# Patient Record
Sex: FEMALE | Race: WHITE | NOT HISPANIC OR LATINO | Employment: FULL TIME | URBAN - METROPOLITAN AREA
[De-identification: names, ages, dates, MRNs, and addresses within clinical notes are randomized per-mention and may not be internally consistent; named-entity substitution may affect disease eponyms.]

---

## 2017-01-31 ENCOUNTER — OFFICE VISIT (OUTPATIENT)
Dept: GASTROENTEROLOGY | Facility: CLINIC | Age: 47
End: 2017-01-31
Payer: COMMERCIAL

## 2017-01-31 ENCOUNTER — HOSPITAL ENCOUNTER (EMERGENCY)
Facility: HOSPITAL | Age: 47
Discharge: HOME | End: 2017-01-31
Attending: EMERGENCY MEDICINE
Payer: COMMERCIAL

## 2017-01-31 VITALS
BODY MASS INDEX: 38.22 KG/M2 | HEART RATE: 68 BPM | DIASTOLIC BLOOD PRESSURE: 78 MMHG | HEIGHT: 62 IN | WEIGHT: 207.69 LBS | SYSTOLIC BLOOD PRESSURE: 130 MMHG

## 2017-01-31 VITALS
BODY MASS INDEX: 28.71 KG/M2 | DIASTOLIC BLOOD PRESSURE: 71 MMHG | OXYGEN SATURATION: 97 % | RESPIRATION RATE: 17 BRPM | TEMPERATURE: 98 F | WEIGHT: 156 LBS | HEART RATE: 61 BPM | HEIGHT: 62 IN | SYSTOLIC BLOOD PRESSURE: 129 MMHG

## 2017-01-31 DIAGNOSIS — K76.89 NODULE ON LIVER: ICD-10-CM

## 2017-01-31 DIAGNOSIS — R10.13 EPIGASTRIC ABDOMINAL PAIN: Primary | ICD-10-CM

## 2017-01-31 DIAGNOSIS — K80.20 GALLSTONES: ICD-10-CM

## 2017-01-31 DIAGNOSIS — K80.50 BILIARY COLIC: Primary | ICD-10-CM

## 2017-01-31 DIAGNOSIS — R11.14 BILIOUS VOMITING WITH NAUSEA: ICD-10-CM

## 2017-01-31 DIAGNOSIS — K80.20 CALCULUS OF GALLBLADDER WITHOUT CHOLECYSTITIS WITHOUT OBSTRUCTION: ICD-10-CM

## 2017-01-31 LAB
ALBUMIN SERPL BCP-MCNC: 3.6 G/DL
ALP SERPL-CCNC: 55 U/L
ALT SERPL W/O P-5'-P-CCNC: 20 U/L
ANION GAP SERPL CALC-SCNC: 12 MMOL/L
AST SERPL-CCNC: 19 U/L
B-HCG UR QL: NEGATIVE
BACTERIA #/AREA URNS HPF: NORMAL /HPF
BASOPHILS # BLD AUTO: 0.07 K/UL
BASOPHILS NFR BLD: 0.8 %
BILIRUB SERPL-MCNC: 0.3 MG/DL
BILIRUB UR QL STRIP: NEGATIVE
BUN SERPL-MCNC: 12 MG/DL
CALCIUM SERPL-MCNC: 9.4 MG/DL
CHLORIDE SERPL-SCNC: 106 MMOL/L
CLARITY UR: CLEAR
CO2 SERPL-SCNC: 21 MMOL/L
COLOR UR: YELLOW
CREAT SERPL-MCNC: 1 MG/DL
DIFFERENTIAL METHOD: NORMAL
EOSINOPHIL # BLD AUTO: 0.4 K/UL
EOSINOPHIL NFR BLD: 4.3 %
ERYTHROCYTE [DISTWIDTH] IN BLOOD BY AUTOMATED COUNT: 12.5 %
EST. GFR  (AFRICAN AMERICAN): >60 ML/MIN/1.73 M^2
EST. GFR  (NON AFRICAN AMERICAN): >60 ML/MIN/1.73 M^2
GLUCOSE SERPL-MCNC: 88 MG/DL
GLUCOSE UR QL STRIP: NEGATIVE
HCT VFR BLD AUTO: 40.8 %
HGB BLD-MCNC: 14.2 G/DL
HGB UR QL STRIP: NEGATIVE
KETONES UR QL STRIP: NEGATIVE
LEUKOCYTE ESTERASE UR QL STRIP: ABNORMAL
LIPASE SERPL-CCNC: 27 U/L
LYMPHOCYTES # BLD AUTO: 3.3 K/UL
LYMPHOCYTES NFR BLD: 38.8 %
MCH RBC QN AUTO: 28.9 PG
MCHC RBC AUTO-ENTMCNC: 34.8 %
MCV RBC AUTO: 83 FL
MICROSCOPIC COMMENT: NORMAL
MONOCYTES # BLD AUTO: 0.6 K/UL
MONOCYTES NFR BLD: 6.6 %
NEUTROPHILS # BLD AUTO: 4.2 K/UL
NEUTROPHILS NFR BLD: 49.5 %
NITRITE UR QL STRIP: NEGATIVE
PH UR STRIP: 8 [PH] (ref 5–8)
PLATELET # BLD AUTO: 269 K/UL
PMV BLD AUTO: 9.8 FL
POTASSIUM SERPL-SCNC: 3.6 MMOL/L
PROT SERPL-MCNC: 6.9 G/DL
PROT UR QL STRIP: NEGATIVE
RBC # BLD AUTO: 4.91 M/UL
RBC #/AREA URNS HPF: 2 /HPF (ref 0–4)
SODIUM SERPL-SCNC: 139 MMOL/L
SP GR UR STRIP: 1.01 (ref 1–1.03)
SQUAMOUS #/AREA URNS HPF: 5 /HPF
URN SPEC COLLECT METH UR: ABNORMAL
UROBILINOGEN UR STRIP-ACNC: NEGATIVE EU/DL
WBC # BLD AUTO: 8.54 K/UL
WBC #/AREA URNS HPF: 1 /HPF (ref 0–5)

## 2017-01-31 PROCEDURE — 63600175 PHARM REV CODE 636 W HCPCS: Performed by: EMERGENCY MEDICINE

## 2017-01-31 PROCEDURE — 83690 ASSAY OF LIPASE: CPT

## 2017-01-31 PROCEDURE — 80053 COMPREHEN METABOLIC PANEL: CPT

## 2017-01-31 PROCEDURE — 85025 COMPLETE CBC W/AUTO DIFF WBC: CPT

## 2017-01-31 PROCEDURE — 25000003 PHARM REV CODE 250: Performed by: EMERGENCY MEDICINE

## 2017-01-31 PROCEDURE — 81025 URINE PREGNANCY TEST: CPT

## 2017-01-31 PROCEDURE — 99284 EMERGENCY DEPT VISIT MOD MDM: CPT | Mod: 25

## 2017-01-31 PROCEDURE — 96376 TX/PRO/DX INJ SAME DRUG ADON: CPT

## 2017-01-31 PROCEDURE — 99204 OFFICE O/P NEW MOD 45 MIN: CPT | Mod: S$GLB,,, | Performed by: INTERNAL MEDICINE

## 2017-01-31 PROCEDURE — 96361 HYDRATE IV INFUSION ADD-ON: CPT

## 2017-01-31 PROCEDURE — 99999 PR PBB SHADOW E&M-EST. PATIENT-LVL III: CPT | Mod: PBBFAC,,, | Performed by: INTERNAL MEDICINE

## 2017-01-31 PROCEDURE — 96375 TX/PRO/DX INJ NEW DRUG ADDON: CPT

## 2017-01-31 PROCEDURE — 81000 URINALYSIS NONAUTO W/SCOPE: CPT

## 2017-01-31 PROCEDURE — 96374 THER/PROPH/DIAG INJ IV PUSH: CPT

## 2017-01-31 RX ORDER — HYDROMORPHONE HYDROCHLORIDE 2 MG/ML
1 INJECTION, SOLUTION INTRAMUSCULAR; INTRAVENOUS; SUBCUTANEOUS
Status: COMPLETED | OUTPATIENT
Start: 2017-01-31 | End: 2017-01-31

## 2017-01-31 RX ORDER — SODIUM CHLORIDE 9 MG/ML
1000 INJECTION, SOLUTION INTRAVENOUS
Status: COMPLETED | OUTPATIENT
Start: 2017-01-31 | End: 2017-01-31

## 2017-01-31 RX ORDER — MEPERIDINE HYDROCHLORIDE 50 MG/ML
25 INJECTION INTRAMUSCULAR; INTRAVENOUS; SUBCUTANEOUS
Status: COMPLETED | OUTPATIENT
Start: 2017-01-31 | End: 2017-01-31

## 2017-01-31 RX ORDER — ONDANSETRON 2 MG/ML
4 INJECTION INTRAMUSCULAR; INTRAVENOUS
Status: COMPLETED | OUTPATIENT
Start: 2017-01-31 | End: 2017-01-31

## 2017-01-31 RX ORDER — OXYCODONE AND ACETAMINOPHEN 10; 325 MG/1; MG/1
1 TABLET ORAL
Status: COMPLETED | OUTPATIENT
Start: 2017-01-31 | End: 2017-01-31

## 2017-01-31 RX ORDER — LEVOTHYROXINE SODIUM 88 UG/1
125 TABLET ORAL DAILY
Status: ON HOLD | COMMUNITY
End: 2017-02-13 | Stop reason: CLARIF

## 2017-01-31 RX ORDER — OXYCODONE AND ACETAMINOPHEN 10; 325 MG/1; MG/1
1 TABLET ORAL EVERY 4 HOURS PRN
Qty: 18 TABLET | Refills: 0 | Status: ON HOLD | OUTPATIENT
Start: 2017-01-31 | End: 2017-02-13 | Stop reason: HOSPADM

## 2017-01-31 RX ORDER — ONDANSETRON 4 MG/1
4 TABLET, FILM COATED ORAL EVERY 8 HOURS PRN
Qty: 12 TABLET | Refills: 0 | Status: SHIPPED | OUTPATIENT
Start: 2017-01-31 | End: 2017-02-10

## 2017-01-31 RX ORDER — HYOSCYAMINE SULFATE 0.5 MG/ML
0.5 INJECTION, SOLUTION SUBCUTANEOUS
Status: COMPLETED | OUTPATIENT
Start: 2017-01-31 | End: 2017-01-31

## 2017-01-31 RX ORDER — PANTOPRAZOLE SODIUM 20 MG/1
20 TABLET, DELAYED RELEASE ORAL DAILY
Qty: 30 TABLET | Refills: 11 | Status: SHIPPED | OUTPATIENT
Start: 2017-01-31 | End: 2017-03-10

## 2017-01-31 RX ORDER — PROPRANOLOL HYDROCHLORIDE 20 MG/1
60 TABLET ORAL DAILY
Status: ON HOLD | COMMUNITY
End: 2017-02-13 | Stop reason: CLARIF

## 2017-01-31 RX ADMIN — OXYCODONE HYDROCHLORIDE AND ACETAMINOPHEN 1 TABLET: 10; 325 TABLET ORAL at 07:01

## 2017-01-31 RX ADMIN — SODIUM CHLORIDE 1000 ML: 0.9 INJECTION, SOLUTION INTRAVENOUS at 05:01

## 2017-01-31 RX ADMIN — HYDROMORPHONE HYDROCHLORIDE 1 MG: 2 INJECTION, SOLUTION INTRAMUSCULAR; INTRAVENOUS; SUBCUTANEOUS at 08:01

## 2017-01-31 RX ADMIN — HYOSCYAMINE SULFATE 0.5 MG: 0.5 INJECTION, SOLUTION SUBCUTANEOUS at 05:01

## 2017-01-31 RX ADMIN — ONDANSETRON 4 MG: 2 INJECTION INTRAMUSCULAR; INTRAVENOUS at 08:01

## 2017-01-31 RX ADMIN — ONDANSETRON 4 MG: 2 INJECTION INTRAMUSCULAR; INTRAVENOUS at 06:01

## 2017-01-31 RX ADMIN — MEPERIDINE HYDROCHLORIDE 25 MG: 50 INJECTION INTRAMUSCULAR; INTRAVENOUS; SUBCUTANEOUS at 06:01

## 2017-01-31 NOTE — PROGRESS NOTES
Subjective:       Patient ID: Luana Lou is a 47 y.o. female.    Chief Complaint: Cholelithiasis    HPI Comments: The patient has been having epigastric abdominal pain on and off over the past week. It is described as being pressure like sensation in the epigastrium and a sharp component. It spread over the entire abdomen from there. The 1st 2 episodes lasted ~ 3-4 hours. There was no relationship to meals in fact they eat at ~5 o'clock. The episodes occur at 3 or 4 in the morning. Pain is worst when lying back; seems better with leaning forward. Nausea with the last episode last night, and bilious vomiting which lead to temporary relief of pain. No Coffee ground or BRB emesis. No relief from Tums, Gas X or pepto-bismol.     Review of Systems   Constitutional: Negative for activity change, appetite change, chills, diaphoresis, fatigue, fever and unexpected weight change.   HENT: Negative for congestion, ear discharge, ear pain, hearing loss, nosebleeds, postnasal drip and tinnitus.    Eyes: Negative for photophobia and visual disturbance.   Respiratory: Negative for apnea, cough, choking, chest tightness, shortness of breath and wheezing.    Cardiovascular: Negative for chest pain, palpitations and leg swelling.   Gastrointestinal: Positive for abdominal pain, nausea and vomiting. Negative for abdominal distention, anal bleeding, blood in stool, constipation, diarrhea and rectal pain.   Genitourinary: Negative for difficulty urinating, dyspareunia, dysuria, flank pain, frequency, hematuria, menstrual problem, pelvic pain, urgency, vaginal bleeding and vaginal discharge.   Musculoskeletal: Negative for arthralgias, back pain, gait problem, joint swelling, myalgias and neck stiffness.   Skin: Negative for pallor and rash.   Neurological: Negative for dizziness, tremors, seizures, syncope, speech difficulty, weakness, numbness and headaches.   Hematological: Negative for adenopathy.    Psychiatric/Behavioral: Negative for agitation, confusion, hallucinations, sleep disturbance and suicidal ideas.       Objective:      Physical Exam   Constitutional: She is oriented to person, place, and time. She appears well-developed and well-nourished.   HENT:   Head: Normocephalic and atraumatic.   Bilateral turbinate congestion   Eyes: Conjunctivae and EOM are normal. Pupils are equal, round, and reactive to light. Right eye exhibits no discharge. Left eye exhibits no discharge. No scleral icterus.   Neck: Normal range of motion. Neck supple. No JVD present. No thyromegaly present.   Cardiovascular: Normal rate, regular rhythm, normal heart sounds and intact distal pulses.  Exam reveals no gallop and no friction rub.    No murmur heard.  Pulmonary/Chest: Effort normal and breath sounds normal. No respiratory distress. She has no wheezes. She has no rales. She exhibits no tenderness.   Abdominal: Soft. Bowel sounds are normal. She exhibits no distension and no mass. There is no tenderness. There is no rebound and no guarding.   Musculoskeletal: Normal range of motion. She exhibits no edema.   Lymphadenopathy:     She has no cervical adenopathy.   Neurological: She is alert and oriented to person, place, and time. She has normal reflexes. She exhibits normal muscle tone. Coordination normal.   Skin: Skin is warm and dry. No rash noted. No erythema. No pallor.   Psychiatric: She has a normal mood and affect. Her behavior is normal. Judgment and thought content normal.   Vitals reviewed.      Assessment:     Epigastric Abdominal Pain    Nausea and vomiting    Cholelithiasis    Solitary Liver Nodule  No diagnosis found.    Plan:       MRI.  Referred to general surgery; discussed in detail the nature of a solitary liver nodule workup and the implications and possible complications that are related.

## 2017-01-31 NOTE — ED AVS SNAPSHOT
[facility] -   [address] [address] [address]               Luana Lou   2017  5:19 AM   ED    Description:  Female : 1970   Department:  [facility]           Your Care was Coordinated By:     Provider Role From To    Tomeka Hoffman MD Attending Provider 17 0527 17 [phone]    Corinna Santana MD Attending Provider 17 0604 --      Reason for Visit     Abdominal Pain           Diagnoses this Visit        Comments    Biliary colic    -  Primary     Gallstones           ED Disposition     None           To Do List           Follow-up Information     Follow up with [facility] GENER[facility]ERY. Schedule an appointment as soon as possible for a visit in 1 day.    Specialty:  General Surgery    Why:  Return to the Emergency Room, If symptoms worsen    Contact information:    [address] [place] [id number]  [phone]       These Medications        Disp Refills Start End    oxycodone-acetaminophen (PERCOCET)  mg per tablet 18 tablet 0 2017     Take 1 tablet by mouth every 4 (four) hours as needed for Pain. - Oral    ondansetron (ZOFRAN) 4 MG tablet 12 tablet 0 2017     Take 1 tablet (4 mg total) by mouth every 8 (eight) hours as needed. - Oral      [facility]     [facility] -  Assistance  Registered nurses in the [facility] provide clinical advisement, health education, appointment booking, and other advisory services.  Call for this free service at [phone].             Medications           Message regarding Medications     Verify the changes and/or additions to your medication regime listed below are the same as discussed with your clinician today.  If any of these changes or additions are incorrect, please notify your healthcare provider.        START taking these NEW medications        Refills     oxycodone-acetaminophen (PERCOCET)  mg per tablet 0    Sig: Take 1 tablet by mouth every 4 (four) hours as needed for Pain.    Class: Print    Route: Oral    ondansetron (ZOFRAN) 4 MG tablet 0    Sig: Take 1 tablet (4 mg total) by mouth every 8 (eight) hours as needed.    Class: Print    Route: Oral      These medications were administered today        Dose Freq    hyoscyamine injection 0.5 mg 0.5 mg ED 1 Time    Sig: Inject 1 mL (0.5 mg total) into the vein ED 1 Time.    Class: Normal    Route: Intravenous    0.9%  NaCl infusion 1,000 mL ED 1 Time    Sig: Inject 1,000 mLs into the vein ED 1 Time.    Class: Normal    Route: Intravenous    meperidine 50 mg/mL injection 25 mg 25 mg ED 1 Time    Sig: Inject 0.5 mLs (25 mg total) into the vein ED 1 Time.    Class: Normal    Route: Intravenous    ondansetron injection 4 mg 4 mg ED 1 Time    Sig: Inject 4 mg into the vein ED 1 Time.    Class: Normal    Route: Intravenous    oxycodone-acetaminophen  mg per tablet 1 tablet 1 tablet ED 1 Time    Sig: Take 1 tablet by mouth ED 1 Time.    Class: Normal    Route: Oral    hydromorphone (PF) injection 1 mg 1 mg ED 1 Time    Sig: Inject 0.5 mLs (1 mg total) into the vein ED 1 Time.    Class: Normal    Route: Intravenous    ondansetron injection 4 mg 4 mg ED 1 Time    Sig: Inject 4 mg into the vein ED 1 Time.    Class: Normal    Route: Intravenous           Verify that the below list of medications is an accurate representation of the medications you are currently taking.  If none reported, the list may be blank. If incorrect, please contact your healthcare provider. Carry this list with you in case of emergency.           Current Medications     levothyroxine (SYNTHROID) 88 MCG tablet Take 88 mcg by mouth once daily.    propranolol (INDERAL) 20 MG tablet Take 20 mg by mouth once daily.    ondansetron (ZOFRAN) 4 MG tablet Take 1 tablet (4 mg total) by mouth every 8 (eight) hours as needed.    oxycodone-acetaminophen  "(PERCOCET)  mg per tablet Take 1 tablet by mouth every 4 (four) hours as needed for Pain.           Clinical Reference Information           Your Vitals Were     BP Pulse Temp Resp Height Weight    153/82 68 98.2 °F (36.8 °C) (Oral) 17 5' 2" (1.575 m) 70.8 kg (156 lb)    Last Period SpO2 BMI          01/05/2017 (Approximate) 99% 28.53 kg/m2        Allergies as of 1/31/2017        Reactions    Compazine [Prochlorperazine]       Immunizations Administered on Date of Encounter - 1/31/2017     None      ED Micro, Lab, POCT     Start Ordered       Status Ordering Provider    01/31/17 0607 01/31/17 0606  Pregnancy, urine rapid (UPT)  Once      Final result     01/31/17 0540 01/31/17 0540  CBC W/ AUTO DIFFERENTIAL  STAT      Final result     01/31/17 0540 01/31/17 0540  Comp. Metabolic Panel  STAT      Final result     01/31/17 0540 01/31/17 0540  Lipase  Once      Final result     01/31/17 0540 01/31/17 0540  Urinalysis - Clean Catch  STAT      Final result     01/31/17 0540 01/31/17 0540  Urinalysis Microscopic  Once      Final result       ED Imaging Orders     Start Ordered       Status Ordering Provider    01/31/17 0540 01/31/17 0540  US Abdomen Limited  1 time imaging      In process       Discharge References/Attachments     GALLSTONES WITH BILIARY COLIC (CONFIRMED) (ENGLISH)    GALLSTONES, DISCHARGE INSTRUCTIONS (ENGLISH)      [facility] Sign-Up     Activating your [facility] account is as easy as 1-2-3!     1) Visit [email or web address], select Sign Up Now, enter this activation code and your date of birth, then select Next.  [email or web address]  Expires: 3/17/2017  8:41 AM      2) Create a username and password to use when you visit [facility] in the future and select a security question in case you lose your password and select Next.    3) Enter your e-mail address and click Sign Up!    Additional Information  If you have questions, please e-mail [email or web address] or call [phone] to talk to our [facility] " staff. Remember, [facility] is NOT to be used for urgent needs. For medical emergencies, dial 911.          [facility] complies with applicable Federal civil rights laws and does not discriminate on the basis of race, color, national origin, age, disability, or sex.        Language Assistance Services     ATTENTION: Language assistance services are available, free of charge. Please call [phone].      ATENCIÓN: Si habla español, tiene a lebron disposición servicios gratuitos de asistencia lingüística. Llame al [phone].     CHÚ Ý: N?u b?n nói Ti?ng Vi?t, có các d?ch v? h? tr? ngôn ng? mi?n phí dành cho b?n. G?i s? [phone].

## 2017-01-31 NOTE — ED PROVIDER NOTES
SCRIBE #1 NOTE: I, am scribing for, and in the presence of. Other sections scribed: HPI, ROS, & PEx.   SCRIBE #2 NOTE: I, Saulo Linda, am scribing for, and in the presence of,  Corinna Santana MD. I have scribed the remaining portions of the note not scribed by Scribe #1.     History      Chief Complaint   Patient presents with    Abdominal Pain     generalized upper quadrant abd pain 8/10 that began 0230 this morning; continuous nausea and dry heaving       Review of patient's allergies indicates:   Allergen Reactions    Compazine [prochlorperazine]         HPI   HPI    1/31/2017, 5:34 AM   History obtained from the patient      History of Present Illness: Luana Lou is a 47 y.o. female patient who presents to the Emergency Department for epigastric abdominal pain which onset gradually since 3 hours PTA. Symptoms are constant and moderate in severity. Pt reports she  had similar episodes of the same pain. No mitigating or exacerbating factors reported. Associated sxs include nausea. Patient denies any fever, emesis, diarrhea, chills, constipation, hematochezia, dysuria, hematuria, urinary frequency, and all other sxs at this time. Prior Tx includes Pepto Bismol and Gas X. No further complaints or concerns at this time.     Arrival mode: Personal vehicle    PCP: Nallely Mejia MD       Past Medical History:  Past Medical History   Diagnosis Date    Hypothyroid     Migraines        Past Surgical History:  History reviewed. No pertinent past surgical history.      Family History:  History reviewed. No pertinent family history.    Social History:  Social History     Social History Main Topics    Smoking status: Never Smoker    Smokeless tobacco: Not on file    Alcohol use No    Drug use: No    Sexual activity: Unknown       ROS   Review of Systems   Constitutional: Negative for chills and fever.   HENT: Negative for sore throat.    Respiratory: Negative for shortness of breath.     Cardiovascular: Negative for chest pain.   Gastrointestinal: Positive for abdominal pain (upper epigastic region) and nausea. Negative for blood in stool, constipation, diarrhea and vomiting.   Genitourinary: Negative for dysuria, frequency and hematuria.   Musculoskeletal: Negative for back pain.   Skin: Negative for rash.   Neurological: Negative for weakness.   Hematological: Does not bruise/bleed easily.       Physical Exam    Initial Vitals   BP Pulse Resp Temp SpO2   01/31/17 0516 01/31/17 0516 01/31/17 0516 01/31/17 0516 01/31/17 0516   127/79 65 20 98.2 °F (36.8 °C) 100 %      Physical Exam  Nursing Notes and Vital Signs Reviewed.  Constitutional: Patient is in no acute distress. Awake and alert. Well-developed and well-nourished. obese  Head: Atraumatic. Normocephalic.  Eyes: PERRL. EOM intact. Conjunctivae are not pale. No scleral icterus.  ENT: Mucous membranes are moist. Oropharynx is clear and symmetric.    Neck: Supple. Full ROM. No lymphadenopathy.  Cardiovascular: Regular rate. Regular rhythm. No murmurs, rubs, or gallops. Distal pulses are 2+ and symmetric.  Pulmonary/Chest: No respiratory distress. Clear to auscultation bilaterally. No wheezing, rales, or rhonchi.  Abdominal: Soft and non-distended.  Right upper quadrant tenderness. No rebound, guarding, or rigidity.  Good bowel sounds.    Genitourinary: No CVA tenderness  Musculoskeletal: Moves all extremities. No obvious deformities. No edema. No calf tenderness.  Skin: Warm and dry.  Neurological:  Alert, awake, and appropriate.  Normal speech.  No acute focal neurological deficits are appreciated.  Psychiatric: Normal affect. Good eye contact. Appropriate in content.    ED Course    Procedures  ED Vital Signs:  Vitals:    01/31/17 0516 01/31/17 0532 01/31/17 0715 01/31/17 0800   BP: 127/79 (!) 140/80 (!) 159/90 (!) 153/82   Pulse: 65 90 74 68   Resp: 20 19 13 17   Temp: 98.2 °F (36.8 °C)      TempSrc: Oral      SpO2: 100% 100% 98% 99%  "  Weight: 70.8 kg (156 lb)      Height: 5' 2" (1.575 m)          Abnormal Lab Results:  Labs Reviewed   COMPREHENSIVE METABOLIC PANEL - Abnormal; Notable for the following:        Result Value    CO2 21 (*)     All other components within normal limits   URINALYSIS - Abnormal; Notable for the following:     Leukocytes, UA Trace (*)     All other components within normal limits   CBC W/ AUTO DIFFERENTIAL   LIPASE   PREGNANCY TEST, URINE RAPID   URINALYSIS MICROSCOPIC        All Lab Results:  Results for orders placed or performed during the hospital encounter of 01/31/17   CBC W/ AUTO DIFFERENTIAL   Result Value Ref Range    WBC 8.54 3.90 - 12.70 K/uL    RBC 4.91 4.00 - 5.40 M/uL    Hemoglobin 14.2 12.0 - 16.0 g/dL    Hematocrit 40.8 37.0 - 48.5 %    MCV 83 82 - 98 fL    MCH 28.9 27.0 - 31.0 pg    MCHC 34.8 32.0 - 36.0 %    RDW 12.5 11.5 - 14.5 %    Platelets 269 150 - 350 K/uL    MPV 9.8 9.2 - 12.9 fL    Gran # 4.2 1.8 - 7.7 K/uL    Lymph # 3.3 1.0 - 4.8 K/uL    Mono # 0.6 0.3 - 1.0 K/uL    Eos # 0.4 0.0 - 0.5 K/uL    Baso # 0.07 0.00 - 0.20 K/uL    Gran% 49.5 38.0 - 73.0 %    Lymph% 38.8 18.0 - 48.0 %    Mono% 6.6 4.0 - 15.0 %    Eosinophil% 4.3 0.0 - 8.0 %    Basophil% 0.8 0.0 - 1.9 %    Differential Method Automated    Comp. Metabolic Panel   Result Value Ref Range    Sodium 139 136 - 145 mmol/L    Potassium 3.6 3.5 - 5.1 mmol/L    Chloride 106 95 - 110 mmol/L    CO2 21 (L) 23 - 29 mmol/L    Glucose 88 70 - 110 mg/dL    BUN, Bld 12 6 - 20 mg/dL    Creatinine 1.0 0.5 - 1.4 mg/dL    Calcium 9.4 8.7 - 10.5 mg/dL    Total Protein 6.9 6.0 - 8.4 g/dL    Albumin 3.6 3.5 - 5.2 g/dL    Total Bilirubin 0.3 0.1 - 1.0 mg/dL    Alkaline Phosphatase 55 55 - 135 U/L    AST 19 10 - 40 U/L    ALT 20 10 - 44 U/L    Anion Gap 12 8 - 16 mmol/L    eGFR if African American >60 >60 mL/min/1.73 m^2    eGFR if non African American >60 >60 mL/min/1.73 m^2   Lipase   Result Value Ref Range    Lipase 27 4 - 60 U/L   Urinalysis - Clean Catch "   Result Value Ref Range    Specimen UA Urine, Clean Catch     Color, UA Yellow Yellow, Straw, Jennifer    Appearance, UA Clear Clear    pH, UA 8.0 5.0 - 8.0    Specific Gravity, UA 1.015 1.005 - 1.030    Protein, UA Negative Negative    Glucose, UA Negative Negative    Ketones, UA Negative Negative    Bilirubin (UA) Negative Negative    Occult Blood UA Negative Negative    Nitrite, UA Negative Negative    Urobilinogen, UA Negative <2.0 EU/dL    Leukocytes, UA Trace (A) Negative   Pregnancy, urine rapid (UPT)   Result Value Ref Range    Preg Test, Ur Negative    Urinalysis Microscopic   Result Value Ref Range    RBC, UA 2 0 - 4 /hpf    WBC, UA 1 0 - 5 /hpf    Bacteria, UA Rare None-Occ /hpf    Squam Epithel, UA 5 /hpf    Microscopic Comment SEE COMMENT          Imaging Results:  Imaging Results         US Abdomen Limited (Final result) Result time:  01/31/17 08:44:18    Final result by Eliza Samuels MD (01/31/17 08:44:18)    Impression:           Cholelithiasis.  No sonographic evidence of acute cholecystitis or biliary duct dilatation.    Indeterminate 2.5 cm hypoechoic mass in the left hepatic lobe.  Recommend nonurgent dedicated MRI of the liver with and without intravenous contrast for further evaluation.      Electronically signed by: ELIZA SAMUELS MD  Date:     01/31/17  Time:    08:44     Narrative:    Exam: US ABDOMEN LIMITED    Clinical History: Mid epigastric pain.  Initial encounter.      Findings:     The liver size and echotexture is normal. Subtle 2.5 cm solid well-defined hypoechoic, subcapsular mass in the left hepatic lobe. No gallbladder wall thickening or pericholecystic fluid.  There are 2 echogenic shadowing small gallstones present. The common duct measures 5 mm. The right kidney measures 10.5cm in long axis and has a normal sonographic appearance. Visualized pancreas and inferior vena cava appear normal. No free fluid in the upper abdomen. Hepatopedal flow noted in the portal vein.                       The Emergency Provider reviewed the vital signs and test results, which are outlined above.    ED Discussion     6:00 AM: Dr. Hoffman transfers care of pt to Dr. Corinna Santana, pending lab and radiology results.    8:40 AM: Reassessed pt at this time.  No distress noted, mild RUQ tenderness, negative Carter's sign. Pt states her condition has improved at this time and she is feeling better, pain well controlled no vomiting, labs unremarkable. Discussed with pt all pertinent ED information and results. Discussed pt dx and plan of tx. Gave pt all f/u outpatient with general surgery and return to the ED instructions intractable pain, vomiting, fever or any new or concerning symptoms. All questions and concerns were addressed at this time. Pt expresses understanding of information and instructions, and is comfortable with plan to discharge. Pt is stable for discharge.    I discussed with patient and/or family/caretaker that evaluation in the ED does not suggest any emergent or life threatening medical conditions requiring immediate intervention beyond what was provided in the ED, and I believe patient is safe for discharge.  Regardless, an unremarkable evaluation in the ED does not preclude the development or presence of a serious of life threatening condition. As such, patient was instructed to return immediately for any worsening or change in current symptoms.      ED Medication(s):  Medications   hyoscyamine injection 0.5 mg (0.5 mg Intravenous Given 1/31/17 0546)   0.9%  NaCl infusion (0 mLs Intravenous Stopped 1/31/17 0630)   meperidine 50 mg/mL injection 25 mg (25 mg Intravenous Given 1/31/17 0614)   ondansetron injection 4 mg (4 mg Intravenous Given 1/31/17 0611)   oxycodone-acetaminophen  mg per tablet 1 tablet (1 tablet Oral Given 1/31/17 0745)   hydromorphone (PF) injection 1 mg (1 mg Intravenous Given 1/31/17 0806)   ondansetron injection 4 mg (4 mg Intravenous Given 1/31/17 0805)       New  Prescriptions    ONDANSETRON (ZOFRAN) 4 MG TABLET    Take 1 tablet (4 mg total) by mouth every 8 (eight) hours as needed.    OXYCODONE-ACETAMINOPHEN (PERCOCET)  MG PER TABLET    Take 1 tablet by mouth every 4 (four) hours as needed for Pain.       Follow-up Information     Follow up with [facility]. Schedule an appointment as soon as possible for a visit in 1 day.    Specialty:  General Surgery    Why:  Return to the Emergency Room, If symptoms worsen    Contact information:    [address] [facility] [address] [phone]            Medical Decision Making    Medical Decision Making:   Clinical Tests:   Lab Tests: Ordered and Reviewed  Radiological Study: Ordered and Reviewed           Scribe Attestation:   Scribe #1: I performed the above scribed service and the documentation accurately describes the services I performed. I attest to the accuracy of the note.    Attending:   Physician Attestation Statement for Scribe #1: I, Tomeka Hoffman MD, personally performed the services described in this documentation, as scribed by Kris Khanna, in my presence, and it is both accurate and complete.       Scribe Attestation:   Scribe #2: I performed the above scribed service and the documentation accurately describes the services I performed. I attest to the accuracy of the note.    Attending Attestation:           Physician Attestation for Scribe:    Physician Attestation Statement for Scribe #2: I, Corinna Santana MD, reviewed documentation, as scribed by Saulo Linda in my presence, and it is both accurate and complete. I also acknowledge and confirm the content of the note done by Scribe #1.          Clinical Impression       ICD-10-CM ICD-9-CM   1. Biliary colic K80.50 574.20   2. Gallstones K80.20 574.20       Disposition:   Disposition: Discharged  Condition: Stable         Corinna Santana MD  01/31/17 0854

## 2017-02-01 ENCOUNTER — TELEPHONE (OUTPATIENT)
Dept: GASTROENTEROLOGY | Facility: CLINIC | Age: 47
End: 2017-02-01

## 2017-02-01 NOTE — TELEPHONE ENCOUNTER
----- Message from Elizabeth Holm sent at 2/1/2017  9:48 AM CST -----  Would like to speak to nurse. Please call back at [phone]. Thanks//cdb

## 2017-02-01 NOTE — TELEPHONE ENCOUNTER
Called patient and patient did not answer, Called patient to inform patient that MRI is scheduled for Tuesday.

## 2017-02-06 ENCOUNTER — TELEPHONE (OUTPATIENT)
Dept: GASTROENTEROLOGY | Facility: CLINIC | Age: 47
End: 2017-02-06

## 2017-02-06 NOTE — TELEPHONE ENCOUNTER
Called and did a Peer to Peer with patients insurance. Patient peer to peer was approved and authorization number that was given was [id number]. Number was given to pre services.

## 2017-02-06 NOTE — TELEPHONE ENCOUNTER
----- Message from Anil Carmen sent at 2/6/2017  9:07 AM CST -----  Contact: Pt  Pt request call from nurse regarding her MRI schedule for tomorrow, pt state her insurance needs more information before they will pay, please contact pt at [phone]

## 2017-02-07 ENCOUNTER — HOSPITAL ENCOUNTER (OUTPATIENT)
Dept: RADIOLOGY | Facility: HOSPITAL | Age: 47
Discharge: HOME | End: 2017-02-07
Attending: INTERNAL MEDICINE
Payer: COMMERCIAL

## 2017-02-07 DIAGNOSIS — R10.13 EPIGASTRIC ABDOMINAL PAIN: ICD-10-CM

## 2017-02-07 DIAGNOSIS — K80.20 CALCULUS OF GALLBLADDER WITHOUT CHOLECYSTITIS WITHOUT OBSTRUCTION: ICD-10-CM

## 2017-02-07 DIAGNOSIS — K76.89 NODULE ON LIVER: ICD-10-CM

## 2017-02-07 DIAGNOSIS — R11.14 BILIOUS VOMITING WITH NAUSEA: ICD-10-CM

## 2017-02-07 PROCEDURE — 25500020 PHARM REV CODE 255: Performed by: INTERNAL MEDICINE

## 2017-02-07 PROCEDURE — A9585 GADOBUTROL INJECTION: HCPCS | Performed by: INTERNAL MEDICINE

## 2017-02-07 PROCEDURE — 74183 MRI ABD W/O CNTR FLWD CNTR: CPT | Mod: TC

## 2017-02-07 RX ORDER — GADOBUTROL 604.72 MG/ML
9 INJECTION INTRAVENOUS
Status: COMPLETED | OUTPATIENT
Start: 2017-02-07 | End: 2017-02-07

## 2017-02-07 RX ADMIN — GADOBUTROL 9 ML: 604.72 INJECTION INTRAVENOUS at 08:02

## 2017-02-09 ENCOUNTER — LAB VISIT (OUTPATIENT)
Dept: LAB | Facility: HOSPITAL | Age: 47
End: 2017-02-09
Attending: INTERNAL MEDICINE
Payer: COMMERCIAL

## 2017-02-09 ENCOUNTER — OFFICE VISIT (OUTPATIENT)
Dept: GASTROENTEROLOGY | Facility: CLINIC | Age: 47
End: 2017-02-09
Payer: COMMERCIAL

## 2017-02-09 VITALS
HEIGHT: 62 IN | WEIGHT: 198.44 LBS | BODY MASS INDEX: 36.52 KG/M2 | SYSTOLIC BLOOD PRESSURE: 128 MMHG | HEART RATE: 74 BPM | DIASTOLIC BLOOD PRESSURE: 78 MMHG

## 2017-02-09 DIAGNOSIS — K76.89 LIVER NODULE: ICD-10-CM

## 2017-02-09 DIAGNOSIS — K80.20 CALCULUS OF GALLBLADDER WITHOUT CHOLECYSTITIS WITHOUT OBSTRUCTION: ICD-10-CM

## 2017-02-09 DIAGNOSIS — K80.51 CALCULUS OF BILE DUCT WITHOUT CHOLANGITIS WITH OBSTRUCTION: Primary | ICD-10-CM

## 2017-02-09 DIAGNOSIS — K80.51 CALCULUS OF BILE DUCT WITHOUT CHOLANGITIS WITH OBSTRUCTION: ICD-10-CM

## 2017-02-09 LAB
ALBUMIN SERPL BCP-MCNC: 3.8 G/DL
ALP SERPL-CCNC: 63 U/L
ALT SERPL W/O P-5'-P-CCNC: 38 U/L
AST SERPL-CCNC: 31 U/L
BILIRUB DIRECT SERPL-MCNC: 0.3 MG/DL
BILIRUB SERPL-MCNC: 0.8 MG/DL
LIPASE SERPL-CCNC: 18 U/L
PROT SERPL-MCNC: 7.1 G/DL

## 2017-02-09 PROCEDURE — 99999 PR PBB SHADOW E&M-EST. PATIENT-LVL III: CPT | Mod: PBBFAC,,, | Performed by: INTERNAL MEDICINE

## 2017-02-09 PROCEDURE — 99213 OFFICE O/P EST LOW 20 MIN: CPT | Mod: S$GLB,,, | Performed by: INTERNAL MEDICINE

## 2017-02-09 PROCEDURE — 80076 HEPATIC FUNCTION PANEL: CPT

## 2017-02-09 PROCEDURE — 36415 COLL VENOUS BLD VENIPUNCTURE: CPT

## 2017-02-09 PROCEDURE — 83690 ASSAY OF LIPASE: CPT

## 2017-02-09 NOTE — MR AVS SNAPSHOT
[facility]  [address]  [place] [address]  Phone: [phone]  Fax: [phone]                  Luana Lou   2017 9:00 AM   Office Visit    Description:  Female : 1970   Provider:  Jamie Perez III, MD   [facility]           Reason for Visit     Follow-up           Diagnoses this Visit        Comments    Calculus of bile duct without cholangitis with obstruction    -  Primary            To Do List           Future Appointments        Provider Department Dept Phone    2017 1:30 PM LAB, SAME DAY [facility] [phone]    2/10/2017 9:45 AM Christian Light MD [facility] General Surgery [phone]      Goals (5 Years of Data)     None      [facility]     [facility] Nurse Care Line -  Assistance  Registered nurses in the [facility] Center provide clinical advisement, health education, appointment booking, and other advisory services.  Call for this free service at [phone].             Medications           Message regarding Medications     Verify the changes and/or additions to your medication regime listed below are the same as discussed with your clinician today.  If any of these changes or additions are incorrect, please notify your healthcare provider.             Verify that the below list of medications is an accurate representation of the medications you are currently taking.  If none reported, the list may be blank. If incorrect, please contact your healthcare provider. Carry this list with you in case of emergency.           Current Medications     levothyroxine (SYNTHROID) 88 MCG tablet Take 88 mcg by mouth once daily.    ondansetron (ZOFRAN) 4 MG tablet Take 1 tablet (4 mg total) by mouth every 8 (eight) hours as needed.    oxycodone-acetaminophen (PERCOCET)  mg per tablet Take 1 tablet by mouth every 4 (four) hours as needed for Pain.     "pantoprazole (PROTONIX) 20 MG tablet Take 1 tablet (20 mg total) by mouth once daily.    propranolol (INDERAL) 20 MG tablet Take 20 mg by mouth once daily.           Clinical Reference Information           Your Vitals Were     BP Pulse Height Weight Last Period BMI    128/78 74 5' 2" (1.575 m) 90 kg (198 lb 6.6 oz) 01/05/2017 (Approximate) 36.29 kg/m2      Blood Pressure          Most Recent Value    BP  128/78      Allergies as of 2/9/2017     Compazine [Prochlorperazine]    Amoxicillin-pot Clavulanate    Doxycycline    Latex, Natural Rubber      Immunizations Administered on Date of Encounter - 2/9/2017     None      Orders Placed During Today's Visit      Normal Orders This Visit    Case request GI: ERCP     Future Labs/Procedures Expected by Expires    Hepatic function panel  2/9/2017 4/10/2018    Lipase  2/9/2017 4/10/2018      Language Assistance Services     ATTENTION: Language assistance services are available, free of charge. Please call [phone].      ATENCIÓN: Si habla español, tiene a lebron disposición servicios gratuitos de asistencia lingüística. Llame al [phone].     CHÚ Ý: N?u b?n nói Ti?ng Vi?t, có các d?ch v? h? tr? ngôn ng? mi?n phí dành cho b?n. G?i s? [phone].         [facility] complies with applicable Federal civil rights laws and does not discriminate on the basis of race, color, national origin, age, disability, or sex.        "

## 2017-02-10 ENCOUNTER — OFFICE VISIT (OUTPATIENT)
Dept: SURGERY | Facility: CLINIC | Age: 47
End: 2017-02-10
Payer: COMMERCIAL

## 2017-02-10 ENCOUNTER — ANESTHESIA EVENT (OUTPATIENT)
Dept: SURGERY | Facility: HOSPITAL | Age: 47
End: 2017-02-10
Payer: COMMERCIAL

## 2017-02-10 VITALS
TEMPERATURE: 99 F | SYSTOLIC BLOOD PRESSURE: 135 MMHG | DIASTOLIC BLOOD PRESSURE: 74 MMHG | WEIGHT: 198.44 LBS | BODY MASS INDEX: 36.29 KG/M2 | HEART RATE: 58 BPM

## 2017-02-10 DIAGNOSIS — K80.20 GALLSTONES: Primary | ICD-10-CM

## 2017-02-10 PROCEDURE — 99203 OFFICE O/P NEW LOW 30 MIN: CPT | Mod: S$GLB,,, | Performed by: SURGERY

## 2017-02-10 PROCEDURE — 99999 PR PBB SHADOW E&M-EST. PATIENT-LVL III: CPT | Mod: PBBFAC,,, | Performed by: SURGERY

## 2017-02-10 RX ORDER — SODIUM CHLORIDE, SODIUM LACTATE, POTASSIUM CHLORIDE, CALCIUM CHLORIDE 600; 310; 30; 20 MG/100ML; MG/100ML; MG/100ML; MG/100ML
INJECTION, SOLUTION INTRAVENOUS CONTINUOUS
Status: CANCELLED | OUTPATIENT
Start: 2017-02-10

## 2017-02-10 RX ORDER — ONDANSETRON 2 MG/ML
4 INJECTION INTRAMUSCULAR; INTRAVENOUS EVERY 12 HOURS PRN
Status: CANCELLED | OUTPATIENT
Start: 2017-02-10

## 2017-02-10 RX ORDER — NORGESTIMATE AND ETHINYL ESTRADIOL 7DAYSX3 28
1 KIT ORAL DAILY
COMMUNITY
End: 2017-10-03 | Stop reason: SDUPTHER

## 2017-02-10 NOTE — PRE ADMISSION SCREENING
Pre op instructions reviewed with patient per phone:    To confirm, Your surgeon has instructed you:  Surgery is scheduled 02/13/17 at 1315.      Please report to [facility] 1st floor main lobby by 1145  Pre admit office to call later only if arrival time changes.      INSTRUCTIONS IMPORTANT!!!  ¨ Do not eat, drink, or smoke after 12 midnight, may have water and apple juice until 3 hours prior to surgery. OK to brush teeth, no gum, candy or mints!    ¨ Take only these medicines with a small swallow of water-morning of surgery.  Synthroid, Propranolol      ____  Do not wear makeup, including mascara.  ____  No powder, lotions or creams to surgical area.  ____  Please remove all jewelry, including piercings and leave at home.  ____  No money or valuables needed. Please leave at home.  ____  Please bring identification and insurance information to hospital.  ____  If going home the same day, arrange for a ride home. You will not be able to   drive if Anesthesia was used.  ____  Children, under 12 years old, must remain in the waiting room with an adult.  They are not allowed in patient areas.  ____  Wear loose fitting clothing. Allow for dressings, bandages.  ____  Stop Aspirin, Ibuprofen, Motrin and Aleve at least 5-7 days before surgery, unless otherwise instructed by your doctor, or the nurse.   You MAY use Tylenol/acetaminophen until day of surgery.  ____  If you take diabetic medication, do not take am of surgery unless instructed by   Doctor.  ____ Stop taking any Fish Oil supplement or any Vitamins that contain Vitamin E at least 5 days prior to surgery.          Bathing Instructions-- The night before surgery and the morning prior to coming to the hospital:   -Do not shave the surgical area.   -Shower and wash your hair and body as usual with your regular soap and shampoo.   -Rinse your hair and body completely.   -Use one packet of hibiclens to wash the surgical site (using your hand)  gently for 5 minutes.  Do not scrub you skin too hard.   -Do not use hibiclens on your head, face, or genitals.   -Do not wash with regular soap after you use the hibiclens.   -Rinse your body thoroughly.   -Dry with clean, soft towel.  Do not use lotion, cream, deodorant, or powders on   the surgical site.    Use antibacterial soap in place of hibiclens if your surgery is on the head, face or genitals.         Surgical Site Infection    Prevention of surgical site infections:     -Keep incisions clean and dry.   -Do not soak/submerge incisions in water until completely healed.   -Do not apply lotions, powders, creams, or deodorants to site.   -Always make sure hands are cleaned with antibacterial soap/ alcohol-based   prior to touching the surgical site.  (This includes doctors, nurses, staff, and yourself.)    Signs and symptoms:   -Redness and pain around the area where you had surgery   -Drainage of cloudy fluid from your surgical wound   -Fever over 100.4  I have read or had read and explained to me, and understand the above information.

## 2017-02-10 NOTE — MR AVS SNAPSHOT
[facility]  [address]  [place] [address]  Phone: [phone]  Fax: [phone]                  Luana Lou   2/10/2017 9:45 AM   Office Visit    Description:  Female : 1970   Provider:  Christian Light MD   Department:  [facility]           Reason for Visit     Consult     Gall Bladder Problem           Diagnoses this Visit        Comments    Gallstones    -  Primary            To Do List           Future Appointments        Provider Department Dept Phone    3/3/2017 9:00 AM Christian Light MD [facility] General Surgery [phone]      Goals (5 Years of Data)     None      [facility]     [facility] Nurse Care Line -  Assistance  Registered nurses in the [facility] Center provide clinical advisement, health education, appointment booking, and other advisory services.  Call for this free service at [phone].             Medications           Message regarding Medications     Verify the changes and/or additions to your medication regime listed below are the same as discussed with your clinician today.  If any of these changes or additions are incorrect, please notify your healthcare provider.        STOP taking these medications     ondansetron (ZOFRAN) 4 MG tablet Take 1 tablet (4 mg total) by mouth every 8 (eight) hours as needed.           Verify that the below list of medications is an accurate representation of the medications you are currently taking.  If none reported, the list may be blank. If incorrect, please contact your healthcare provider. Carry this list with you in case of emergency.           Current Medications     levothyroxine (SYNTHROID) 88 MCG tablet Take 88 mcg by mouth once daily.    oxycodone-acetaminophen (PERCOCET)  mg per tablet Take 1 tablet by mouth every 4 (four) hours as needed for Pain.    pantoprazole (PROTONIX) 20 MG tablet Take 1 tablet (20 mg total) by mouth once  daily.    propranolol (INDERAL) 20 MG tablet Take 20 mg by mouth once daily.           Clinical Reference Information           Your Vitals Were     BP Pulse Temp Weight Last Period BMI    135/74 58 98.6 °F (37 °C) 90 kg (198 lb 6.6 oz) 01/05/2017 (Approximate) 36.29 kg/m2      Blood Pressure          Most Recent Value    BP  135/74      Allergies as of 2/10/2017     Compazine [Prochlorperazine]    Amoxicillin-pot Clavulanate    Doxycycline    Latex, Natural Rubber      Immunizations Administered on Date of Encounter - 2/10/2017     None      Orders Placed During Today's Visit      Normal Orders This Visit    Case Request Operating Room: CHOLECYSTECTOMY-LAPAROSCOPIC and cholangiogram , BIOPSY-LIVER-LAPAROSCOPIC     Medium Risk of VTE       Language Assistance Services     ATTENTION: Language assistance services are available, free of charge. Please call [phone].      ATENCIÓN: Si habla cecille, tiene a lebron disposición servicios gratuitos de asistencia lingüística. Llame al [phone].     CHÚ Ý: N?u b?n nói Ti?ng Vi?t, có các d?ch v? h? tr? ngôn ng? mi?n phí dành cho b?n. G?i s? [phone].         [facility] complies with applicable Federal civil rights laws and does not discriminate on the basis of race, color, national origin, age, disability, or sex.

## 2017-02-10 NOTE — LETTER
February 10, 2017      Jamie Perez III, MD  [address] [address]           [facility]  [address] [place] [address]  Phone: [phone]  Fax: [phone]          Patient: Luana Lou   MR Number: [id number]   YOB: 1970   Date of Visit: 2/10/2017       Dear Dr. Jamie Perez III:    Thank you for referring Luana Lou to me for evaluation. Attached you will find relevant portions of my assessment and plan of care.    If you have questions, please do not hesitate to call me. I look forward to following Luana Lou along with you.    Sincerely,    Christian Light MD    Enclosure  CC:  No Recipients    If you would like to receive this communication electronically, please contact [email or web address] or [phone] to request more information on [employer] access.    For providers and/or their staff who would like to refer a patient to [facility], please contact us through our one-stop-shop provider referral line, [facility] , at [phone].    If you feel you have received this communication in error or would no longer like to receive these types of communications, please e-mail [email or web address]

## 2017-02-10 NOTE — PROGRESS NOTES
CONSULTATION FOR:  Dr. Jamie Perez III.    REASON FOR CONSULTATION:  Gallstones, possible common bile duct stone and a   liver lesion.    HISTORY OF PRESENT ILLNESS:  The patient is a 47-year-old white female who is   actually doing fairly well up until about a week ago.  She had been dieting and   actually lost 6 pounds over a 6-week period, but then about 10 or 12 days ago,   she had an episode of epigastric right upper quadrant pain and severe nausea   that kind went away on its own, and then it occurred again.  The third time it   happened, it has basically stayed.  That has been over the past 11 days now.  It   is sort of a pressure sensation.  It is mainly in the right upper quadrant and   it is always a little bit of discomfort there.  When she eats, it gets worse in   the right upper quadrant and goes in the epigastrium.  It does not radiate to   the back.  Denies any fever, chills, acholic stools, dark urine or jaundice.    Her bowel movements over the last few days have been small, but normal.  Her   urine is normal color.  The last episode, she nausea and a little bit of bilious   violent vomiting.  She cannot really keep solids down well.  She is able to   keep most of her liquids down.  She has been using Zofran for nausea.  She had   originally tried Protonix for this.  It did not help.  The patient had an   ultrasound done, which showed a small shadowing gallstones present.  Common bile   duct and normal size and then a 2.5 cm subcapsular mass in the left lateral   segment of her liver anteriorly.  Because of the liver mass, she actually   underwent an MRI.  The MRI shows the liver mass.  Also on the MRI, the bile   ducts appear to be basically within normal range, but a question of whether it   was a small stone in the distal common bile duct approximately 3 mm.  The   patient actually had further liver functions done yesterday and lipase, which   were normal.    PAST MEDICAL HISTORY:  She has  hypothyroidism and rare migraine headaches.    MEDICATIONS:  Include birth control pills, Synthroid, Zofran p.r.n. for nausea,   propranolol 60 ER for migraines.  ALLERGIES:  Allergic to Compazine with a serious reaction.  She is not allergic   to penicillin and she gets some nausea with the doxycycline.    PAST SURGICAL HISTORY:  She has no real history of surgery.  She has only had a   knife wound to the foot that required irrigation.  The patient is  0.    SOCIAL HISTORY:  Never smoked.  Does not drink alcohol.    FAMILY HISTORY:  Noncontributory.    REVIEW OF SYSTEMS:  CARDIAC:  Denies any cardiac complaints, any chest pains, any palpitations.  RESPIRATORY:  No trouble with breathing.  No shortness of breath or cough.  HEENT:  Denies any visual or auditory complaints.  NEUROLOGIC:  Occasional or rare migraine.  GASTROINTESTINAL:  See present illness.  GENITOURINARY:  Denies any trouble starting or stopping her urine or any kidney   problems.  Denies any neuromuscular complaints.  NEUROLOGIC:  No history of any stroke or any seizures.  HEMATOLOGIC:  Denies any history of any anemia or excess bleeding.    PHYSICAL EXAMINATION:  VITAL SIGNS:  Blood pressure 130/74, pulse is around 60, temperature is normal   at 98.6.  She is 90 kg.  She is alert and oriented x3.  HEENT:  Extraocular movements were intact.  No gross visual or auditory defect.    Her mouth is normal and mucous membranes are well moist.  NECK:  Soft without any adenopathy or thyromegaly.  CHEST:  Clear to auscultation throughout.  HEART:  Reveals normal rate and rhythm.  ABDOMEN:  Mildly obese.  She has got good bowel sounds.  The abdomen essentially   is soft throughout; however, there is some mild tenderness to deep palpation in   the right upper quadrant and epigastrium.  There is no guarding or rebound.    She has good pulses in her groin.  EXTREMITIES:  Reveal adequate strength and range of motion.    I did look at the patient's MRI.  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  Also,   was noted to have gallstones.  Also, this again is not definite evidence of   gallstone in the common bile duct.    IMPRESSION:  1.  Gallstones with the symptoms causing trouble eating regular food.  She is on   a liquid diet at this point to avoid the pain and nausea.  The patient's liver   functions are normal.  Her bile ducts are normal size, so I have low suspicion   for common duct stone.  I think most of her discomfort is due to the gallbladder   symptoms.  2.  A 2.5 cm liver lesion, probably benign.    RECOMMENDATIONS:  I discussed this with Dr. Perez.  Due to the fact that she   has normal liver function tests, her bile ducts are normal size and there is no   definite evidence on the MRI that there is stone in the distal bile duct, I   would proceed with laparoscopic cholecystectomy and serial cholangiogram.  I   explained this to the patient and if the cholangiogram shows stones, then do an   ERCP then.  Also, we will proceed with biopsy of this liver lesion.  If the   liver lesion shows it to be a liver cell adenoma, then we will need to strongly   encourage the patient to stop her birth control pills.  I counseled her as to   the procedure laparoscopic cystectomy, possible need to convert to open   cholecystectomy.  I talked about the cholangiogram and possible need to do an   ERCP afterwards If the cholangiogram is abnormal.  I talked about the liver   biopsy and its risk of bleeding.  I talked about the risk of the gallbladder   surgery including bleeding, infection, injury to the bile duct, bile duct   leakage or injury to the other organs.  The patient is to keep on her Zofran,   try to force plenty of fluids,   avoid NSAIDs and aspirin before the surgery.  She is to be scheduled for the   surgery in a  couple of days.  If she has severe vomiting or cannot keep any   liquids down, she will have to come to the hospital before then.      FAM/JAVIER  dd: 02/10/2017 13:02:32 (CST)  td: 02/10/2017 14:12:00 (CST)  Doc ID   #[id number]  Job ID #[id number]    CC: Jamie Perez M.D.

## 2017-02-13 ENCOUNTER — ANESTHESIA (OUTPATIENT)
Dept: SURGERY | Facility: HOSPITAL | Age: 47
End: 2017-02-13
Payer: COMMERCIAL

## 2017-02-13 ENCOUNTER — HOSPITAL ENCOUNTER (OUTPATIENT)
Facility: HOSPITAL | Age: 47
Discharge: HOME | End: 2017-02-13
Attending: SURGERY | Admitting: SURGERY
Payer: COMMERCIAL

## 2017-02-13 VITALS
TEMPERATURE: 98 F | BODY MASS INDEX: 36.39 KG/M2 | SYSTOLIC BLOOD PRESSURE: 134 MMHG | RESPIRATION RATE: 16 BRPM | WEIGHT: 197.75 LBS | DIASTOLIC BLOOD PRESSURE: 71 MMHG | OXYGEN SATURATION: 95 % | HEART RATE: 69 BPM | HEIGHT: 62 IN

## 2017-02-13 DIAGNOSIS — K80.20 GALLSTONES: ICD-10-CM

## 2017-02-13 LAB
B-HCG UR QL: NEGATIVE
CTP QC/QA: YES

## 2017-02-13 PROCEDURE — 36000709 HC OR TIME LEV III EA ADD 15 MIN: Performed by: SURGERY

## 2017-02-13 PROCEDURE — 37000008 HC ANESTHESIA 1ST 15 MINUTES: Performed by: SURGERY

## 2017-02-13 PROCEDURE — 63600175 PHARM REV CODE 636 W HCPCS: Performed by: SURGERY

## 2017-02-13 PROCEDURE — 99900035 HC TECH TIME PER 15 MIN (STAT)

## 2017-02-13 PROCEDURE — 71000039 HC RECOVERY, EACH ADD'L HOUR: Performed by: SURGERY

## 2017-02-13 PROCEDURE — 88304 TISSUE EXAM BY PATHOLOGIST: CPT | Mod: 26,,, | Performed by: PATHOLOGY

## 2017-02-13 PROCEDURE — 47563 LAPARO CHOLECYSTECTOMY/GRAPH: CPT | Mod: ,,, | Performed by: SURGERY

## 2017-02-13 PROCEDURE — 88313 SPECIAL STAINS GROUP 2: CPT | Mod: 26,,, | Performed by: PATHOLOGY

## 2017-02-13 PROCEDURE — 81025 URINE PREGNANCY TEST: CPT | Performed by: SURGERY

## 2017-02-13 PROCEDURE — 25000003 PHARM REV CODE 250: Performed by: NURSE ANESTHETIST, CERTIFIED REGISTERED

## 2017-02-13 PROCEDURE — 63600175 PHARM REV CODE 636 W HCPCS: Performed by: NURSE ANESTHETIST, CERTIFIED REGISTERED

## 2017-02-13 PROCEDURE — 88342 IMHCHEM/IMCYTCHM 1ST ANTB: CPT | Mod: 26,,, | Performed by: PATHOLOGY

## 2017-02-13 PROCEDURE — 27201423 OPTIME MED/SURG SUP & DEVICES STERILE SUPPLY: Performed by: SURGERY

## 2017-02-13 PROCEDURE — 74300 X-RAY BILE DUCTS/PANCREAS: CPT | Mod: 26,,, | Performed by: SURGERY

## 2017-02-13 PROCEDURE — 25000003 PHARM REV CODE 250: Performed by: ANESTHESIOLOGY

## 2017-02-13 PROCEDURE — 71000033 HC RECOVERY, INTIAL HOUR: Performed by: SURGERY

## 2017-02-13 PROCEDURE — 88342 IMHCHEM/IMCYTCHM 1ST ANTB: CPT | Performed by: PATHOLOGY

## 2017-02-13 PROCEDURE — 25000003 PHARM REV CODE 250: Performed by: SURGERY

## 2017-02-13 PROCEDURE — 37000009 HC ANESTHESIA EA ADD 15 MINS: Performed by: SURGERY

## 2017-02-13 PROCEDURE — 36000708 HC OR TIME LEV III 1ST 15 MIN: Performed by: SURGERY

## 2017-02-13 PROCEDURE — 25500020 PHARM REV CODE 255: Performed by: SURGERY

## 2017-02-13 PROCEDURE — 47379 UNLISTED LAPS PX LIVER: CPT | Mod: ,,, | Performed by: SURGERY

## 2017-02-13 PROCEDURE — 63600175 PHARM REV CODE 636 W HCPCS: Performed by: ANESTHESIOLOGY

## 2017-02-13 RX ORDER — SODIUM CHLORIDE 0.9 % (FLUSH) 0.9 %
3 SYRINGE (ML) INJECTION
Status: DISCONTINUED | OUTPATIENT
Start: 2017-02-13 | End: 2017-02-13 | Stop reason: HOSPADM

## 2017-02-13 RX ORDER — ROCURONIUM BROMIDE 10 MG/ML
INJECTION, SOLUTION INTRAVENOUS
Status: DISCONTINUED | OUTPATIENT
Start: 2017-02-13 | End: 2017-02-13

## 2017-02-13 RX ORDER — SUCCINYLCHOLINE CHLORIDE 20 MG/ML
INJECTION INTRAMUSCULAR; INTRAVENOUS
Status: DISCONTINUED | OUTPATIENT
Start: 2017-02-13 | End: 2017-02-13

## 2017-02-13 RX ORDER — BUPIVACAINE HYDROCHLORIDE AND EPINEPHRINE 2.5; 5 MG/ML; UG/ML
INJECTION, SOLUTION EPIDURAL; INFILTRATION; INTRACAUDAL; PERINEURAL
Status: DISCONTINUED | OUTPATIENT
Start: 2017-02-13 | End: 2017-02-13 | Stop reason: HOSPADM

## 2017-02-13 RX ORDER — ONDANSETRON 8 MG/1
8 TABLET, ORALLY DISINTEGRATING ORAL EVERY 8 HOURS PRN
Status: DISCONTINUED | OUTPATIENT
Start: 2017-02-13 | End: 2017-02-13 | Stop reason: HOSPADM

## 2017-02-13 RX ORDER — SODIUM CHLORIDE, SODIUM LACTATE, POTASSIUM CHLORIDE, CALCIUM CHLORIDE 600; 310; 30; 20 MG/100ML; MG/100ML; MG/100ML; MG/100ML
INJECTION, SOLUTION INTRAVENOUS CONTINUOUS
Status: DISCONTINUED | OUTPATIENT
Start: 2017-02-13 | End: 2017-02-13

## 2017-02-13 RX ORDER — HYDROMORPHONE HYDROCHLORIDE 2 MG/ML
0.2 INJECTION, SOLUTION INTRAMUSCULAR; INTRAVENOUS; SUBCUTANEOUS EVERY 5 MIN PRN
Status: DISCONTINUED | OUTPATIENT
Start: 2017-02-13 | End: 2017-02-13 | Stop reason: HOSPADM

## 2017-02-13 RX ORDER — FENTANYL CITRATE 50 UG/ML
25 INJECTION, SOLUTION INTRAMUSCULAR; INTRAVENOUS EVERY 5 MIN PRN
Status: DISCONTINUED | OUTPATIENT
Start: 2017-02-13 | End: 2017-02-13 | Stop reason: HOSPADM

## 2017-02-13 RX ORDER — SODIUM CHLORIDE, SODIUM LACTATE, POTASSIUM CHLORIDE, CALCIUM CHLORIDE 600; 310; 30; 20 MG/100ML; MG/100ML; MG/100ML; MG/100ML
1000 INJECTION, SOLUTION INTRAVENOUS CONTINUOUS
Status: DISCONTINUED | OUTPATIENT
Start: 2017-02-13 | End: 2017-02-13 | Stop reason: HOSPADM

## 2017-02-13 RX ORDER — LIDOCAINE HCL/PF 100 MG/5ML
SYRINGE (ML) INTRAVENOUS
Status: DISCONTINUED | OUTPATIENT
Start: 2017-02-13 | End: 2017-02-13

## 2017-02-13 RX ORDER — LEVOTHYROXINE SODIUM 125 UG/1
125 TABLET ORAL
Status: DISCONTINUED | OUTPATIENT
Start: 2017-02-14 | End: 2017-02-13 | Stop reason: HOSPADM

## 2017-02-13 RX ORDER — ONDANSETRON 2 MG/ML
INJECTION INTRAMUSCULAR; INTRAVENOUS
Status: DISCONTINUED | OUTPATIENT
Start: 2017-02-13 | End: 2017-02-13

## 2017-02-13 RX ORDER — GLUCAGON 1 MG
KIT INJECTION
Status: DISCONTINUED | OUTPATIENT
Start: 2017-02-13 | End: 2017-02-13

## 2017-02-13 RX ORDER — PROPOFOL 10 MG/ML
VIAL (ML) INTRAVENOUS
Status: DISCONTINUED | OUTPATIENT
Start: 2017-02-13 | End: 2017-02-13

## 2017-02-13 RX ORDER — HYDROMORPHONE HYDROCHLORIDE 1 MG/ML
1 INJECTION, SOLUTION INTRAMUSCULAR; INTRAVENOUS; SUBCUTANEOUS EVERY 4 HOURS PRN
Status: DISCONTINUED | OUTPATIENT
Start: 2017-02-13 | End: 2017-02-13 | Stop reason: HOSPADM

## 2017-02-13 RX ORDER — LEVOTHYROXINE SODIUM 125 UG/1
125 TABLET ORAL DAILY
COMMUNITY
End: 2019-12-02 | Stop reason: SDUPTHER

## 2017-02-13 RX ORDER — OXYCODONE AND ACETAMINOPHEN 10; 325 MG/1; MG/1
1 TABLET ORAL EVERY 4 HOURS PRN
Status: DISCONTINUED | OUTPATIENT
Start: 2017-02-13 | End: 2017-02-13 | Stop reason: HOSPADM

## 2017-02-13 RX ORDER — ONDANSETRON 2 MG/ML
4 INJECTION INTRAMUSCULAR; INTRAVENOUS EVERY 12 HOURS PRN
Status: DISCONTINUED | OUTPATIENT
Start: 2017-02-13 | End: 2017-02-13 | Stop reason: HOSPADM

## 2017-02-13 RX ORDER — KETOROLAC TROMETHAMINE 30 MG/ML
INJECTION, SOLUTION INTRAMUSCULAR; INTRAVENOUS
Status: DISCONTINUED | OUTPATIENT
Start: 2017-02-13 | End: 2017-02-13

## 2017-02-13 RX ORDER — CEFAZOLIN SODIUM 2 G/50ML
2 SOLUTION INTRAVENOUS
Status: COMPLETED | OUTPATIENT
Start: 2017-02-13 | End: 2017-02-13

## 2017-02-13 RX ORDER — MEPERIDINE HYDROCHLORIDE 50 MG/ML
12.5 INJECTION INTRAMUSCULAR; INTRAVENOUS; SUBCUTANEOUS ONCE AS NEEDED
Status: COMPLETED | OUTPATIENT
Start: 2017-02-13 | End: 2017-02-13

## 2017-02-13 RX ORDER — MIDAZOLAM HYDROCHLORIDE 1 MG/ML
INJECTION, SOLUTION INTRAMUSCULAR; INTRAVENOUS
Status: DISCONTINUED | OUTPATIENT
Start: 2017-02-13 | End: 2017-02-13

## 2017-02-13 RX ORDER — NEOSTIGMINE METHYLSULFATE 1 MG/ML
INJECTION, SOLUTION INTRAVENOUS
Status: DISCONTINUED | OUTPATIENT
Start: 2017-02-13 | End: 2017-02-13

## 2017-02-13 RX ORDER — OXYCODONE AND ACETAMINOPHEN 5; 325 MG/1; MG/1
1-2 TABLET ORAL
Qty: 20 TABLET | Refills: 0 | Status: SHIPPED | OUTPATIENT
Start: 2017-02-13 | End: 2017-02-23

## 2017-02-13 RX ORDER — GLYCOPYRROLATE 0.2 MG/ML
INJECTION INTRAMUSCULAR; INTRAVENOUS
Status: DISCONTINUED | OUTPATIENT
Start: 2017-02-13 | End: 2017-02-13

## 2017-02-13 RX ORDER — FENTANYL CITRATE 50 UG/ML
INJECTION, SOLUTION INTRAMUSCULAR; INTRAVENOUS
Status: DISCONTINUED | OUTPATIENT
Start: 2017-02-13 | End: 2017-02-13

## 2017-02-13 RX ORDER — PROPRANOLOL HYDROCHLORIDE 60 MG/1
60 CAPSULE, EXTENDED RELEASE ORAL DAILY
Status: DISCONTINUED | OUTPATIENT
Start: 2017-02-14 | End: 2017-02-13 | Stop reason: HOSPADM

## 2017-02-13 RX ORDER — PROPRANOLOL HYDROCHLORIDE 60 MG/1
60 CAPSULE, EXTENDED RELEASE ORAL DAILY
COMMUNITY
End: 2017-03-10

## 2017-02-13 RX ORDER — NORGESTIMATE AND ETHINYL ESTRADIOL 7DAYSX3 28
1 KIT ORAL DAILY
Status: DISCONTINUED | OUTPATIENT
Start: 2017-02-14 | End: 2017-02-13 | Stop reason: HOSPADM

## 2017-02-13 RX ADMIN — ROCURONIUM BROMIDE 35 MG: 10 INJECTION, SOLUTION INTRAVENOUS at 02:02

## 2017-02-13 RX ADMIN — HYDROMORPHONE HYDROCHLORIDE 0.2 MG: 2 INJECTION, SOLUTION INTRAMUSCULAR; INTRAVENOUS; SUBCUTANEOUS at 06:02

## 2017-02-13 RX ADMIN — CEFAZOLIN SODIUM 2 G: 2 SOLUTION INTRAVENOUS at 02:02

## 2017-02-13 RX ADMIN — PROMETHAZINE HYDROCHLORIDE 6.25 MG: 25 INJECTION INTRAMUSCULAR; INTRAVENOUS at 05:02

## 2017-02-13 RX ADMIN — LIDOCAINE HYDROCHLORIDE 60 MG: 20 INJECTION, SOLUTION INTRAVENOUS at 02:02

## 2017-02-13 RX ADMIN — MEPERIDINE HYDROCHLORIDE 12.5 MG: 50 INJECTION INTRAMUSCULAR; INTRAVENOUS; SUBCUTANEOUS at 04:02

## 2017-02-13 RX ADMIN — GLUCAGON HYDROCHLORIDE 1 MG: KIT at 03:02

## 2017-02-13 RX ADMIN — ROCURONIUM BROMIDE 5 MG: 10 INJECTION, SOLUTION INTRAVENOUS at 02:02

## 2017-02-13 RX ADMIN — ONDANSETRON 4 MG: 2 INJECTION, SOLUTION INTRAMUSCULAR; INTRAVENOUS at 04:02

## 2017-02-13 RX ADMIN — SODIUM CHLORIDE, SODIUM LACTATE, POTASSIUM CHLORIDE, AND CALCIUM CHLORIDE: 600; 310; 30; 20 INJECTION, SOLUTION INTRAVENOUS at 02:02

## 2017-02-13 RX ADMIN — SUCCINYLCHOLINE CHLORIDE 100 MG: 20 INJECTION, SOLUTION INTRAMUSCULAR; INTRAVENOUS at 02:02

## 2017-02-13 RX ADMIN — PROPOFOL 200 MG: 10 INJECTION, EMULSION INTRAVENOUS at 02:02

## 2017-02-13 RX ADMIN — MIDAZOLAM HYDROCHLORIDE 2 MG: 1 INJECTION, SOLUTION INTRAMUSCULAR; INTRAVENOUS at 02:02

## 2017-02-13 RX ADMIN — KETOROLAC TROMETHAMINE 30 MG: 30 INJECTION, SOLUTION INTRAMUSCULAR; INTRAVENOUS at 04:02

## 2017-02-13 RX ADMIN — SODIUM CHLORIDE, SODIUM LACTATE, POTASSIUM CHLORIDE, AND CALCIUM CHLORIDE: 600; 310; 30; 20 INJECTION, SOLUTION INTRAVENOUS at 03:02

## 2017-02-13 RX ADMIN — FENTANYL CITRATE 100 MCG: 50 INJECTION, SOLUTION INTRAMUSCULAR; INTRAVENOUS at 02:02

## 2017-02-13 RX ADMIN — SODIUM CHLORIDE, SODIUM LACTATE, POTASSIUM CHLORIDE, AND CALCIUM CHLORIDE: .6; .31; .03; .02 INJECTION, SOLUTION INTRAVENOUS at 12:02

## 2017-02-13 RX ADMIN — GLYCOPYRROLATE 0.6 MG: 0.2 INJECTION, SOLUTION INTRAMUSCULAR; INTRAVENOUS at 04:02

## 2017-02-13 RX ADMIN — NEOSTIGMINE METHYLSULFATE 4 MG: 1 INJECTION INTRAVENOUS at 04:02

## 2017-02-13 NOTE — BRIEF OP NOTE
[facility]  Brief Operative Note    SUMMARY     Surgery Date: 2/13/2017     Surgeon(s) and Role:     * Christian Light MD - Primary    Assisting Surgeon:DANY de león    Pre-op Diagnosis:  Gallstones [K80.20]2.5cm benign appearing liver mass in left lateral segment    Post-op Diagnosis:  Post-Op Diagnosis Codes:     * Gallstones [K80.20],,2.5cm benign appearing liver mass in left lateral segment of liver    Procedure(s) (LRB):  CHOLECYSTECTOMY-LAPAROSCOPIC WITH CHOLANGIOGRAM  (N/A)  BIOPSY-LIVER-LAPAROSCOPIC (Left)    Anesthesia: General and 20ml of .25% marcaine with epi    Description of Procedure:      Description of the findings of the procedure: normal cholangiogram, gallbladder with small stones, soft liver lesion in left lateral segment    Estimated Blood Loss: 15 mL         Specimens:   Specimen (12h ago through future)    Start     Ordered    02/13/17 1606  Specimen to Pathology - Surgery  Once     Comments:  1) Liver Mass (perm)  2) Gallbladder with Stones (perm)      Dx: Cholelithiasis    02/13/17 1606

## 2017-02-13 NOTE — PROGRESS NOTES
Subjective:       Patient ID: Luana Lou is a 47 y.o. female.    Chief Complaint: Follow-up (MRI)    HPI Comments: The patient was seen in the office after January 31 visit to the ER led to findings of cholelithiasis and a solitary liver nodule.  The details of the visit are available in the note from the visit to my office on January 31 and updated on today's visit.  It was recommended that the patient have an MRI based on the abdominal ultrasound findings in the ER and it was done on February 7.  On the presentation of today's visit we were surprised to see that the lesion noted on abdominal ultrasound, and for which the MRI was ordered, was not mentioned at all on the MRI report.  I Confessed to the patient that I had some concerns; as although it is not entirely unusual for lesion seen on ultrasound not to be visible on MRI, the fact that it was not mentioned at times could mean that the ultrasound on which the study was based had not been viewed in the abnormality possibly not have looked for and missed.  Though this is a common occurrence, in her case it would merit a review with radiology.    There was, however, and unexpected finding and that the radiologist suspected a small common bile duct stone in the MRI.  There was also mention of a borderline enlarged size to the common bile duct at 7 mm.  If in fact this can be confirmed, it would mean that intervention with ERCP would be necessary before the patient's gallbladder could be removed by general surgery.  On implications of all this including possible complications of 90 and intervening in a timely manner leading to cholangitis or gallstone pancreatitis was discussed with the patient.  The patient also had questions as to whether we will confident in our findings and the association between her findings and her symptoms.  This was also discussed in some detail.    The patient also desired to know whether intervention with stone  dissolution therapy could be entertained.  This was also discussed in some detail.  We explained that this was reserved for very special situations, particularly with noncalcified stones and generally in situations where they were not risk of the pending acute process.  It was explained that the slow rate of the dissolution treatment in a patient with ongoing symptoms that could possibly be exacerbated into the above-mentioned complications would not be good.  He only concerns as to whether the common bile duct stone couldn't be confirmed was that up to now her liver function studies have been within normal range.  These will be repeated along with pancreatic enzymes today, and the patient will be referred to general surgery.  We will consider from there whether the patient will require preoperative ERCP, or cholecystectomy with intraoperative cholangiogram confirming the necessity for ERCP.        Review of Systems    Objective:      Physical Exam    Assessment:       1. Calculus of bile duct without cholangitis with obstruction     2.     Cholelithiasis   3.     Solitary liver nodule to be confirmed.   Plan:       Keep Gen. surgery appointment  CT with radiology regarding solitary nodule on ultrasound, which is not mentioned on MRI.

## 2017-02-13 NOTE — TRANSFER OF CARE
"Anesthesia Transfer of Care Note    Patient: Luana Lou    Procedure(s) Performed: Procedure(s) (LRB):  CHOLECYSTECTOMY-LAPAROSCOPIC WITH CHOLANGIOGRAM  (N/A)  BIOPSY-LIVER-LAPAROSCOPIC (Left)    Patient location: PACU    Anesthesia Type: general    Transport from OR: Transported from OR on room air with adequate spontaneous ventilation    Post pain: adequate analgesia    Post assessment: no apparent anesthetic complications    Post vital signs: stable    Level of consciousness: awake, alert and oriented    Nausea/Vomiting: no nausea/vomiting    Complications: none          Last vitals:   Visit Vitals    BP (!) 171/81 (BP Location: Right arm, Patient Position: Sitting, BP Method: Automatic)    Pulse (!) 54    Temp 36.9 °C (98.5 °F) (Oral)    Resp 18    Ht 5' 2" (1.575 m)    Wt 89.7 kg (197 lb 12 oz)    LMP 02/05/2017    SpO2 100%    Breastfeeding No    BMI 36.17 kg/m2     "

## 2017-02-13 NOTE — ANESTHESIA PREPROCEDURE EVALUATION
02/13/2017  Luana Lou is a 47 y.o., female.    [facility] Anesthesia Evaluation    I have reviewed the Patient Summary Reports.    I have reviewed the Nursing Notes.   I have reviewed the Medications.     Review of Systems  Anesthesia Hx:  No previous Anesthesia  Denies Family Hx of Anesthesia complications.   Denies Personal Hx of Anesthesia complications.   Social:  Non-Smoker    EENT/Dental:EENT/Dental Normal   Cardiovascular:  Cardiovascular Normal     Pulmonary:   Asthma asymptomatic    Hepatic/GI:  Hepatic/GI Normal    Neurological:   Headaches    Endocrine:   Hypothyroidism        Physical Exam   Airway/Jaw/Neck:  Airway Findings: Mallampati: II Improves to I with phonation.      Dental:  Dental Findings:   Chest/Lungs:  Chest/Lungs Findings: Clear to auscultation     Heart/Vascular:  Heart Findings: Rate: Normal  Rhythm: Regular Rhythm             Anesthesia Plan  Type of Anesthesia, risks & benefits discussed:  Anesthesia Type:  general  Patient's Preference:   Intra-op Monitoring Plan:   Intra-op Monitoring Plan Comments:   Post Op Pain Control Plan:   Post Op Pain Control Plan Comments:   Induction:    Beta Blocker:  Patient is not currently on a Beta-Blocker (No further documentation required).       Informed Consent: Patient understands risks and agrees with Anesthesia plan.  Questions answered. Anesthesia consent signed with patient.  ASA Score: 2     Day of Surgery Review of History & Physical:    H&P update referred to the surgeon.         Ready For Surgery From Anesthesia Perspective.

## 2017-02-13 NOTE — IP AVS SNAPSHOT
[facility]  [id number] [place] [address] [address] [place] [id number]           Patient Discharge Instructions     Our goal is to set you up for success. This packet includes information on your condition, medications, and your home care. It will help you to care for yourself so you don't get sicker and need to go back to the hospital.     Please ask your nurse if you have any questions.        There are many details to remember when preparing to leave the hospital. Here is what you will need to do:    1. Take your medicine. If you are prescribed medications, review your Medication List in the following pages. You may have new medications to  at the pharmacy and others that you'll need to stop taking. Review the instructions for how and when to take your medications. Talk with your doctor or nurses if you are unsure of what to do.     2. Go to your follow-up appointments. Specific follow-up information is listed in the following pages. Your may be contacted by a transition nurse or clinical provider about future appointments. Be sure we have all of the phone numbers to reach you, if needed. Please contact your provider's office if you are unable to make an appointment.     3. Watch for warning signs. Your doctor or nurse will give you detailed warning signs to watch for and when to call for assistance. These instructions may also include educational information about your condition. If you experience any of warning signs to your health, call your doctor.               ** Verify the list of medication(s) below is accurate and up to date. Carry this with you in case of emergency. If your medications have changed, please notify your healthcare provider.             Medication List      START taking these medications        Additional Info                      oxycodone-acetaminophen 5-325 mg per tablet   Commonly known as:  PERCOCET   Quantity:  20 tablet   Refills:  0   Dose:  1-2 tablet   Replaces:   oxycodone-acetaminophen  mg per tablet    Instructions:  Take 1-2 tablets by mouth every 4 to 6 hours as needed for Pain.     Begin Date    AM    Noon    PM    Bedtime         CONTINUE taking these medications        Additional Info                      levothyroxine 125 MCG tablet   Commonly known as:  SYNTHROID   Refills:  0   Dose:  125 mcg   Indications:  hypothyroidism    Instructions:  Take 125 mcg by mouth once daily.     Begin Date    AM    Noon    PM    Bedtime       norgestimate-ethinyl estradiol 0.18/0.215/0.25 mg-35 mcg (28) tablet   Commonly known as:  ORTHO TRI-CYCLEN,TRI-SPRINTEC   Refills:  0   Dose:  1 tablet    Instructions:  Take 1 tablet by mouth once daily.     Begin Date    AM    Noon    PM    Bedtime       pantoprazole 20 MG tablet   Commonly known as:  PROTONIX   Quantity:  30 tablet   Refills:  11   Dose:  20 mg    Instructions:  Take 1 tablet (20 mg total) by mouth once daily.     Begin Date    AM    Noon    PM    Bedtime       propranolol 60 MG 24 hr capsule   Commonly known as:  INDERAL LA   Refills:  0   Dose:  60 mg   Indications:  Migraine Prevention    Instructions:  Take 60 mg by mouth once daily.     Begin Date    AM    Noon    PM    Bedtime         STOP taking these medications     oxycodone-acetaminophen  mg per tablet   Commonly known as:  PERCOCET   Replaced by:  oxycodone-acetaminophen 5-325 mg per tablet            Where to Get Your Medications      You can get these medications from any pharmacy     Bring a paper prescription for each of these medications     oxycodone-acetaminophen 5-325 mg per tablet                  Please bring to all follow up appointments:    1. A copy of your discharge instructions.  2. All medicines you are currently taking in their original bottles.  3. Identification and insurance card.    Please arrive 15 minutes ahead of scheduled appointment time.    Please call 24 hours in advance if you must reschedule your appointment and/or  "time.        Your Scheduled Appointments     Mar 03, 2017  9:00 AM CST   Post OP with Christian Light MD   [facility]    [address] [facility] [id number] [phone]              Follow-up Information     Please follow up.    Why:  has appt. to see me in 10-14 days        Discharge Instructions     Future Orders    Activity as tolerated     Call MD for:  persistent nausea and vomiting or diarrhea     Call MD for:  redness, tenderness, or signs of infection (pain, swelling, redness, odor or green/yellow discharge around incision site)     Call MD for:  temperature >100.4     Diet general     Scheduling Instructions:    Full liquid on 2/13 and then solid low fat in am 2/14    Questions:    Total calories:      Fat restriction, if any:      Protein restriction, if any:      Na restriction, if any:      Fluid restriction:      Additional restrictions:      Lifting restrictions     Shower on day dressing removed (No bath)         Primary Diagnosis     Your primary diagnosis was:  Gallstones      Admission Information     Date & Time Provider Department [facility]    2/13/2017 11:51 AM Christian Light MD [facility] -  [id number]      Care Providers     Provider Role Specialty Primary office phone    Christian Lgiht MD Attending Provider General Surgery [phone]    Christian Light MD Surgeon  General Surgery [phone]      Your Vitals Were     BP Pulse Temp Resp Height Weight    134/71 (BP Location: Right arm, Patient Position: Lying, BP Method: Automatic) 69 98.1 °F (36.7 °C) (Oral) 16 5' 2" (1.575 m) 89.7 kg (197 lb 12 oz)    Last Period SpO2 BMI          02/05/2017 95% 36.17 kg/m2        Recent Lab Values     No lab values to display.      Pending Labs     Order Current Status    Specimen to Pathology - Surgery Collected (02/13/17 9101)      Allergies as of 2/13/2017        Reactions    Compazine [Prochlorperazine] Anaphylaxis    Caused musculature not " to work properly    Amoxicillin-pot Clavulanate Other (See Comments)    Tightness in chest, fatigue     Doxycycline Rash    Latex, Natural Rubber Rash    Peanut Rash      [facility]     [facility] Nurse Care Line - 24/7 Assistance  Unless otherwise directed by your provider, please contact [facility], our nurse care line that is available for 24/7 assistance.     Registered nurses in the [facility] provide clinical advisement, health education, appointment booking, and other advisory services.  Call for this free service at [phone].        Advance Directives     An advance directive is a document which, in the event you are no longer able to make decisions for yourself, tells your healthcare team what kind of treatment you do or do not want to receive, or who you would like to make those decisions for you.  If you do not currently have an advance directive, [facility] encourages you to create one.  For more information call:  [phone]), [phone] ([phone]),  or log on to [email or web address].        Language Assistance Services     ATTENTION: Language assistance services are available, free of charge. Please call [phone].      ATENCIÓN: Si habla español, tiene a lebron disposición servicios gratuitos de asistencia lingüística. Llame al [phone].     CHÚ Ý: N?u b?n nói Ti?ng Vi?t, có các d?ch v? h? tr? ngôn ng? mi?n phí dành cho b?n. G?i s? [phone].         [facility] -  complies with applicable Federal civil rights laws and does not discriminate on the basis of race, color, national origin, age, disability, or sex.

## 2017-02-13 NOTE — H&P (VIEW-ONLY)
CONSULTATION FOR:  Dr. Jamie Perez III.    REASON FOR CONSULTATION:  Gallstones, possible common bile duct stone and a   liver lesion.    HISTORY OF PRESENT ILLNESS:  The patient is a 47-year-old white female who is   actually doing fairly well up until about a week ago.  She had been dieting and   actually lost 6 pounds over a 6-week period, but then about 10 or 12 days ago,   she had an episode of epigastric right upper quadrant pain and severe nausea   that kind went away on its own, and then it occurred again.  The third time it   happened, it has basically stayed.  That has been over the past 11 days now.  It   is sort of a pressure sensation.  It is mainly in the right upper quadrant and   it is always a little bit of discomfort there.  When she eats, it gets worse in   the right upper quadrant and goes in the epigastrium.  It does not radiate to   the back.  Denies any fever, chills, acholic stools, dark urine or jaundice.    Her bowel movements over the last few days have been small, but normal.  Her   urine is normal color.  The last episode, she nausea and a little bit of bilious   violent vomiting.  She cannot really keep solids down well.  She is able to   keep most of her liquids down.  She has been using Zofran for nausea.  She had   originally tried Protonix for this.  It did not help.  The patient had an   ultrasound done, which showed a small shadowing gallstones present.  Common bile   duct and normal size and then a 2.5 cm subcapsular mass in the left lateral   segment of her liver anteriorly.  Because of the liver mass, she actually   underwent an MRI.  The MRI shows the liver mass.  Also on the MRI, the bile   ducts appear to be basically within normal range, but a question of whether it   was a small stone in the distal common bile duct approximately 3 mm.  The   patient actually had further liver functions done yesterday and lipase, which   were normal.    PAST MEDICAL HISTORY:  She has  hypothyroidism and rare migraine headaches.    MEDICATIONS:  Include birth control pills, Synthroid, Zofran p.r.n. for nausea,   propranolol 60 ER for migraines.  ALLERGIES:  Allergic to Compazine with a serious reaction.  She is not allergic   to penicillin and she gets some nausea with the doxycycline.    PAST SURGICAL HISTORY:  She has no real history of surgery.  She has only had a   knife wound to the foot that required irrigation.  The patient is  0.    SOCIAL HISTORY:  Never smoked.  Does not drink alcohol.    FAMILY HISTORY:  Noncontributory.    REVIEW OF SYSTEMS:  CARDIAC:  Denies any cardiac complaints, any chest pains, any palpitations.  RESPIRATORY:  No trouble with breathing.  No shortness of breath or cough.  HEENT:  Denies any visual or auditory complaints.  NEUROLOGIC:  Occasional or rare migraine.  GASTROINTESTINAL:  See present illness.  GENITOURINARY:  Denies any trouble starting or stopping her urine or any kidney   problems.  Denies any neuromuscular complaints.  NEUROLOGIC:  No history of any stroke or any seizures.  HEMATOLOGIC:  Denies any history of any anemia or excess bleeding.    PHYSICAL EXAMINATION:  VITAL SIGNS:  Blood pressure 130/74, pulse is around 60, temperature is normal   at 98.6.  She is 90 kg.  She is alert and oriented x3.  HEENT:  Extraocular movements were intact.  No gross visual or auditory defect.    Her mouth is normal and mucous membranes are well moist.  NECK:  Soft without any adenopathy or thyromegaly.  CHEST:  Clear to auscultation throughout.  HEART:  Reveals normal rate and rhythm.  ABDOMEN:  Mildly obese.  She has got good bowel sounds.  The abdomen essentially   is soft throughout; however, there is some mild tenderness to deep palpation in   the right upper quadrant and epigastrium.  There is no guarding or rebound.    She has good pulses in her groin.  EXTREMITIES:  Reveal adequate strength and range of motion.    I did look at the patient's MRI.  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  Also,   was noted to have gallstones.  Also, this again is not definite evidence of   gallstone in the common bile duct.    IMPRESSION:  1.  Gallstones with the symptoms causing trouble eating regular food.  She is on   a liquid diet at this point to avoid the pain and nausea.  The patient's liver   functions are normal.  Her bile ducts are normal size, so I have low suspicion   for common duct stone.  I think most of her discomfort is due to the gallbladder   symptoms.  2.  A 2.5 cm liver lesion, probably benign.    RECOMMENDATIONS:  I discussed this with Dr. Perez.  Due to the fact that she   has normal liver function tests, her bile ducts are normal size and there is no   definite evidence on the MRI that there is stone in the distal bile duct, I   would proceed with laparoscopic cholecystectomy and serial cholangiogram.  I   explained this to the patient and if the cholangiogram shows stones, then do an   ERCP then.  Also, we will proceed with biopsy of this liver lesion.  If the   liver lesion shows it to be a liver cell adenoma, then we will need to strongly   encourage the patient to stop her birth control pills.  I counseled her as to   the procedure laparoscopic cystectomy, possible need to convert to open   cholecystectomy.  I talked about the cholangiogram and possible need to do an   ERCP afterwards If the cholangiogram is abnormal.  I talked about the liver   biopsy and its risk of bleeding.  I talked about the risk of the gallbladder   surgery including bleeding, infection, injury to the bile duct, bile duct   leakage or injury to the other organs.  The patient is to keep on her Zofran,   try to force plenty of fluids,   avoid NSAIDs and aspirin before the surgery.  She is to be scheduled for the   surgery in a  couple of days.  If she has severe vomiting or cannot keep any   liquids down, she will have to come to the hospital before then.      FAM/JAVIER  dd: 02/10/2017 13:02:32 (CST)  td: 02/10/2017 14:12:00 (CST)  Doc ID   #[id number]  Job ID #[id number]    CC: Jamie Perez M.D.

## 2017-02-14 NOTE — PROGRESS NOTES
Pt given d/c instructions and copy of paperwork; IV removed; pt taken out via w/c to private vehichle; pt stable upon d/c

## 2017-02-14 NOTE — DISCHARGE SUMMARY
[facility]  Discharge Summary  General Surgery      Admit Date: 2/13/2017    Discharge Date and Time: 2/13/2017  9:30 PM    Attending Physician: No att. providers found     Discharge Provider: Christian Light    Reason for Admission: REMOVAL OF  GB, AND BIOPSY OF LIVER MASS     Procedures Performed: Procedure(s) (LRB):  CHOLECYSTECTOMY-LAPAROSCOPIC WITH CHOLANGIOGRAM  (N/A)  BIOPSY-LIVER-LAPAROSCOPIC (Left)OF 2.5CM MASS BENIGN ON LEFT LATERAL SEGMENT OF LIVER    Hospital Course (synopsis of major diagnoses, care, treatment, and services provided during the course of the hospital stay): TOLERATED LAP ANDRZEJ AND IOC AND LIVER BX. THE LIVER LESION APPEARED TO BE BENIGN, FINAL PATH WAS PENDING. tHE PATIENT HAD A CHOLANGIOGRAM WHICH WAS NORMAL. POST OP BY THAT EVENING, SHE WAS TOLERATING LIQUID DIET, VOIDING WELL, PAIN WAS CONTROLLED. HER VSS, DRESSING DRY.      Consults: none    Significant Diagnostic Studies:      Final Diagnoses:   Principal Problem: Gallstones   Secondary Diagnoses:   Active Hospital Problems    Diagnosis  POA    *Gallstones [K80.20]  Yes      Resolved Hospital Problems    Diagnosis Date Resolved POA   No resolved problems to display.       Discharged Condition: good    Disposition: Home or Self Care    Follow Up/Patient Instructions:     Medications:  Reconciled Home Medications:   Discharge Medication List as of 2/13/2017  8:38 PM      START taking these medications    Details   oxycodone-acetaminophen (PERCOCET) 5-325 mg per tablet Take 1-2 tablets by mouth every 4 to 6 hours as needed for Pain., Starting 2/13/2017, Until Mon 2/27/17, Print         CONTINUE these medications which have NOT CHANGED    Details   levothyroxine (SYNTHROID) 125 MCG tablet Take 125 mcg by mouth once daily., Until Discontinued, Historical Med      norgestimate-ethinyl estradiol (ORTHO TRI-CYCLEN,TRI-SPRINTEC) 0.18/0.215/0.25 mg-35 mcg (28) tablet Take 1 tablet by mouth once daily., Until Discontinued,  Historical Med      propranolol (INDERAL LA) 60 MG 24 hr capsule Take 60 mg by mouth once daily., Until Discontinued, Historical Med      pantoprazole (PROTONIX) 20 MG tablet Take 1 tablet (20 mg total) by mouth once daily., Starting 1/31/2017, Until Wed 1/31/18, Normal         STOP taking these medications       oxycodone-acetaminophen (PERCOCET)  mg per tablet Comments:   Reason for Stopping:               Discharge Procedure Orders  Diet general   Scheduling Instructions: Full liquid on 2/13 and then solid low fat in am 2/14     Activity as tolerated     Shower on day dressing removed (No bath)     Lifting restrictions     Call MD for:  temperature >100.4     Call MD for:  persistent nausea and vomiting or diarrhea     Call MD for:  redness, tenderness, or signs of infection (pain, swelling, redness, odor or green/yellow discharge around incision site)     Remove dressing in 48 hours       Follow-up Information     Please follow up.    Why:  has appt. to see me in 10-14 days

## 2017-02-14 NOTE — PROGRESS NOTES
Pt arrived to floor via stretcher per Flako Pacu RN; awake and alert; ambulated to bed w/ steady gait noted; oriented to room and call light; educated on hourly rounding; full assess per flowsheets

## 2017-02-14 NOTE — ANESTHESIA POSTPROCEDURE EVALUATION
"Anesthesia Post Evaluation    Patient: Luana Lou    Procedure(s) Performed: Procedure(s) (LRB):  CHOLECYSTECTOMY-LAPAROSCOPIC WITH CHOLANGIOGRAM  (N/A)  BIOPSY-LIVER-LAPAROSCOPIC (Left)    Final Anesthesia Type: general  Patient location during evaluation: PACU  Patient participation: Yes- Able to Participate  Level of consciousness: awake and alert  Post-procedure vital signs: reviewed and stable  Pain management: adequate  Airway patency: patent  PONV status at discharge: No PONV  Anesthetic complications: no      Cardiovascular status: blood pressure returned to baseline  Respiratory status: unassisted and spontaneous ventilation  Hydration status: euvolemic  Follow-up not needed.        Visit Vitals    /80    Pulse 84    Temp 36.6 °C (97.9 °F) (Temporal)    Resp (!) 21    Ht 5' 2" (1.575 m)    Wt 89.7 kg (197 lb 12 oz)    LMP 02/05/2017    SpO2 97%    Breastfeeding No    BMI 36.17 kg/m2       Pain/Maciel Score: Pain Assessment Performed: Yes (2/13/2017  4:45 PM)  Presence of Pain: complains of pain/discomfort (2/13/2017  6:30 PM)  Pain Rating Prior to Med Admin: 4 (2/13/2017  6:33 PM)  Maciel Score: 10 (2/13/2017  6:30 PM)      "

## 2017-02-14 NOTE — OP NOTE
DATE OF PROCEDURE:  02/13/2017    PREOPERATIVE DIAGNOSES:  1.  Symptomatic cholelithiasis.  2.  MRCP showing a question of a common duct stone, although the patient's   common duct was normal in size and she had normal liver function test.  3.  A 2.5 cm lesion subcapsular in the left lateral segment of her liver.    POSTOPERATIVE DIAGNOSES:  1.  Symptomatic cholelithiasis.  2.  MRCP showing a question of a common duct stone, although the patient's   common duct was normal in size and she had normal liver function tests.  3.  A 2.5 cm lesion of subcapsular in the left lateral segment of her liver.    OPERATIVE PROCEDURES:  1.  Laparoscopic cholecystectomy and intraoperative cholangiogram.  2.  Biopsy of lesion in the left lateral segment of the liver.    OPERATIVE SURGEON:  Christian Light M.D.    ASSISTANT:  DANY Grace.    ESTIMATED BLOOD LOSS:  15 mL.    FLUIDS RECEIVED:  1200 mL of crystalloid.    ANESTHESIA:  General endotracheal, also 30 mL of 0.25% Marcaine with epinephrine   used to inject the trocar sites, injecting the muscle fascia, subQ and skin.    OPERATIVE FINDINGS:  A distended gallbladder with multiple 2-3 mm stones, long,   thin cystic duct, normal intraoperative cholangiogram, a soft, benign appearing   lesion about 2.5 cm in size, fairly flat sub-capsularly on the liver.    FINAL DIAGNOSES:  Awaiting permanent sections.    OPERATIVE PROCEDURE:  After adequate general endotracheal anesthesia obtained in   supine position, the patient's abdomen was prepped and draped in sterile   fashion.  Preoperatively, she had SCD hose and IV antibiotics.  After this was   done, an incision was made a few centimeters cranial to the umbilicus in the   midline.  It was carried through skin using knife.  Abdominal fascia was grasped   and pulled up.  Veress needle was easily placed in the abdomen with good saline   drop test.  Abdomen was then insufflated to 15 mmHg.  After this was done, a   5-mm trocar was placed  followed by a 5-mm scope.  No trauma was noted at the   insertion site.  The patient was placed in reverse Trendelenburg and 10-mm   trocar was placed just to right of midline subcostally and two 5 mm right   subcostal trocars.  The liver was examined, in the left lateral segment   anteriorly, lesion was noted.  The lesion appeared to be very soft.  It is felt   on MRI to probably represent a focal nodular hyperplasia.  Using the biopsy   forceps, several biopsies were taken of this lesion.  Then using a cautery,   hemostasis was easily obtained.  A small piece of Surgicel was then placed over   it after hemostasis was obtained.  At the end of the case before removing the   trocars, this lesion was reexamined again and no bleeding was noted.  After the   biopsy, attention was then turned toward the gallbladder.  The gallbladder was   grasped and retracted superolaterally.  Some mild adhesions of the omentum to   the gallbladder, which were taken down using careful blunt dissection and light   cautery.  Gallbladder was then grasped at the infundibulum also.  Dissection was   then carried down in the area of triangle of Calot, some loose adhesions over   the duodenum was loosely adherent to the gallbladder.  These were taken down   using careful blunt dissection, taking care to stay away from the duodenum.    After this was done, dissection was carried down in the triangle of Calot and   careful blunt dissection, a long, small cystic duct was identified, common duct   was identified.  The cystic artery was identified, was dissected out.  Because   the artery is running right over the duct, the cystic artery was clipped twice   proximally, once distally and divided.  Dissection was then carried around the   cystic duct.  Clip was then placed on the cystic duct at the cystic duct   gallbladder junction.  A small ductotomy was made in the cystic duct near the   common duct junction.  A cholangiocath was then placed.   This was held in place   with a clip.  Cholangiogram was performed and appeared to be normal with the   common duct appeared to be of normal size.  The hepatic radicles filled well,   the dye filled the duodenum well.  There was no radiolucency seen.  After this   was done, the clip was removed from the cystic duct.  The cystic duct was then   clipped thrice proximally and twice on the gallbladder side and transected.    Gallbladder was then removed using careful blunt dissection and hook cautery and   posterior artery was identified.  It was dissected out, clipped.  After   removing the gallbladder from the gallbladder bed, the gallbladder bed was   examined and was irrigated.  No bleeding was noted.  All the clips appeared to   be in good position on the cystic duct and cystic arteries.  Gallbladder was   then placed in an Endobag and brought out through the 10-mm trocar incision.    The 10-mm trocar incision was then closed using a suture passer.  The cone was   placed and the suture was placed around the fascia using laparoscopic-assisted   technique.  Cone was removed, the sutures tied down and the fascia appeared to   be good and closed.  The other trocars were then removed under direct vision and   abdomen was then desufflated.  After this was done, all the incisions were   injected with Marcaine as stated above.  Subcutaneous tissue at the 10 mm trocar   site was closed using 4-0 Vicryl.  Skin was closed in all 4 incisions using 4-0   Vicryl subcuticular.  Steri-Strips were applied, 4 x 4s and tape.  The patient   was then awakened in the Operating Room and taken to Recovery Room in stable   condition.    /wendy [id number] blank(s)       FAM/  dd: 02/13/2017 16:53:35 (CST)  td: 02/13/2017 21:45:13 (CST)  Doc ID   #[id number]  Job ID #[id number]    CC: [employer] [facility]

## 2017-02-15 ENCOUNTER — PATIENT MESSAGE (OUTPATIENT)
Dept: GASTROENTEROLOGY | Facility: CLINIC | Age: 47
End: 2017-02-15

## 2017-02-19 ENCOUNTER — HOSPITAL ENCOUNTER (EMERGENCY)
Facility: HOSPITAL | Age: 47
Discharge: HOME | End: 2017-02-19
Attending: EMERGENCY MEDICINE
Payer: COMMERCIAL

## 2017-02-19 VITALS
WEIGHT: 194 LBS | HEIGHT: 62 IN | DIASTOLIC BLOOD PRESSURE: 77 MMHG | HEART RATE: 54 BPM | BODY MASS INDEX: 35.7 KG/M2 | OXYGEN SATURATION: 100 % | SYSTOLIC BLOOD PRESSURE: 143 MMHG | RESPIRATION RATE: 16 BRPM | TEMPERATURE: 98 F

## 2017-02-19 DIAGNOSIS — R10.9 ABDOMINAL PAIN, UNSPECIFIED LOCATION: Primary | ICD-10-CM

## 2017-02-19 DIAGNOSIS — K29.00 ACUTE SUPERFICIAL GASTRITIS WITHOUT HEMORRHAGE: ICD-10-CM

## 2017-02-19 DIAGNOSIS — S39.012A LUMBAR STRAIN, INITIAL ENCOUNTER: ICD-10-CM

## 2017-02-19 LAB
ALBUMIN SERPL BCP-MCNC: 3.5 G/DL
ALP SERPL-CCNC: 87 U/L
ALT SERPL W/O P-5'-P-CCNC: 144 U/L
ANION GAP SERPL CALC-SCNC: 13 MMOL/L
AST SERPL-CCNC: 72 U/L
BACTERIA #/AREA URNS HPF: NORMAL /HPF
BASOPHILS # BLD AUTO: 0.05 K/UL
BASOPHILS NFR BLD: 0.6 %
BILIRUB SERPL-MCNC: 0.7 MG/DL
BILIRUB UR QL STRIP: NEGATIVE
BUN SERPL-MCNC: 6 MG/DL
CALCIUM SERPL-MCNC: 9.5 MG/DL
CHLORIDE SERPL-SCNC: 103 MMOL/L
CLARITY UR: CLEAR
CO2 SERPL-SCNC: 24 MMOL/L
COLOR UR: YELLOW
CREAT SERPL-MCNC: 1 MG/DL
DIFFERENTIAL METHOD: NORMAL
EOSINOPHIL # BLD AUTO: 0.2 K/UL
EOSINOPHIL NFR BLD: 2.2 %
ERYTHROCYTE [DISTWIDTH] IN BLOOD BY AUTOMATED COUNT: 12.2 %
EST. GFR  (AFRICAN AMERICAN): >60 ML/MIN/1.73 M^2
EST. GFR  (NON AFRICAN AMERICAN): >60 ML/MIN/1.73 M^2
GLUCOSE SERPL-MCNC: 82 MG/DL
GLUCOSE UR QL STRIP: NEGATIVE
HCT VFR BLD AUTO: 40.5 %
HGB BLD-MCNC: 14.5 G/DL
HGB UR QL STRIP: NEGATIVE
KETONES UR QL STRIP: ABNORMAL
LEUKOCYTE ESTERASE UR QL STRIP: ABNORMAL
LIPASE SERPL-CCNC: 18 U/L
LYMPHOCYTES # BLD AUTO: 2.1 K/UL
LYMPHOCYTES NFR BLD: 27 %
MCH RBC QN AUTO: 29.7 PG
MCHC RBC AUTO-ENTMCNC: 35.8 %
MCV RBC AUTO: 83 FL
MICROSCOPIC COMMENT: NORMAL
MONOCYTES # BLD AUTO: 0.7 K/UL
MONOCYTES NFR BLD: 8.2 %
NEUTROPHILS # BLD AUTO: 4.9 K/UL
NEUTROPHILS NFR BLD: 62 %
NITRITE UR QL STRIP: NEGATIVE
PH UR STRIP: 8 [PH] (ref 5–8)
PLATELET # BLD AUTO: 273 K/UL
PMV BLD AUTO: 9.9 FL
POTASSIUM SERPL-SCNC: 3.5 MMOL/L
PROT SERPL-MCNC: 7.1 G/DL
PROT UR QL STRIP: NEGATIVE
RBC # BLD AUTO: 4.89 M/UL
SODIUM SERPL-SCNC: 140 MMOL/L
SP GR UR STRIP: 1.01 (ref 1–1.03)
SQUAMOUS #/AREA URNS HPF: 2 /HPF
TROPONIN I SERPL DL<=0.01 NG/ML-MCNC: <0.006 NG/ML
URN SPEC COLLECT METH UR: ABNORMAL
UROBILINOGEN UR STRIP-ACNC: NEGATIVE EU/DL
WBC # BLD AUTO: 7.88 K/UL
WBC #/AREA URNS HPF: 2 /HPF (ref 0–5)

## 2017-02-19 PROCEDURE — 84484 ASSAY OF TROPONIN QUANT: CPT

## 2017-02-19 PROCEDURE — 93005 ELECTROCARDIOGRAM TRACING: CPT

## 2017-02-19 PROCEDURE — 81000 URINALYSIS NONAUTO W/SCOPE: CPT

## 2017-02-19 PROCEDURE — 96361 HYDRATE IV INFUSION ADD-ON: CPT

## 2017-02-19 PROCEDURE — 85025 COMPLETE CBC W/AUTO DIFF WBC: CPT

## 2017-02-19 PROCEDURE — 93010 ELECTROCARDIOGRAM REPORT: CPT | Mod: ,,, | Performed by: INTERNAL MEDICINE

## 2017-02-19 PROCEDURE — 96360 HYDRATION IV INFUSION INIT: CPT

## 2017-02-19 PROCEDURE — 80053 COMPREHEN METABOLIC PANEL: CPT

## 2017-02-19 PROCEDURE — 99284 EMERGENCY DEPT VISIT MOD MDM: CPT | Mod: 25

## 2017-02-19 PROCEDURE — 83690 ASSAY OF LIPASE: CPT

## 2017-02-19 PROCEDURE — 25000003 PHARM REV CODE 250: Performed by: EMERGENCY MEDICINE

## 2017-02-19 RX ORDER — TIZANIDINE 4 MG/1
4 TABLET ORAL 3 TIMES DAILY PRN
Qty: 12 TABLET | Refills: 0 | Status: SHIPPED | OUTPATIENT
Start: 2017-02-19 | End: 2017-02-23

## 2017-02-19 RX ORDER — ONDANSETRON 2 MG/ML
4 INJECTION INTRAMUSCULAR; INTRAVENOUS
Status: DISCONTINUED | OUTPATIENT
Start: 2017-02-19 | End: 2017-02-19 | Stop reason: HOSPADM

## 2017-02-19 RX ORDER — SUCRALFATE 1 G/1
1 TABLET ORAL 4 TIMES DAILY
Qty: 20 TABLET | Refills: 0 | Status: SHIPPED | OUTPATIENT
Start: 2017-02-19 | End: 2017-03-01

## 2017-02-19 RX ADMIN — SODIUM CHLORIDE 1000 ML: 0.9 INJECTION, SOLUTION INTRAVENOUS at 11:02

## 2017-02-19 RX ADMIN — DICYCLOMINE HYDROCHLORIDE 50 ML: 10 SOLUTION ORAL at 11:02

## 2017-02-19 NOTE — ED NOTES
In bed resting,aaox3,skin warm dry to touch,equal bilateral chest rise and fall,side rails up x 2,bed locked and in low position,C-monitor on and recording,instructed to call with any needs. Pt and  updated on POC at this time.

## 2017-02-19 NOTE — ED AVS SNAPSHOT
[facility]  [address]  [place] [address]               Luana Lou   2017  9:48 AM   ED    Description:  Female : 1970   Department:  [facility]            Your Care was Coordinated By:     Provider Role From To    Parker Bello MD Attending Provider 17 0958 --      Reason for Visit     Abdominal Pain           Diagnoses this Visit        Comments    Abdominal pain, unspecified location    -  Primary     Acute superficial gastritis without hemorrhage         Lumbar strain, initial encounter           ED Disposition     None           To Do List           Follow-up Information     Follow up with Nallely Mejia MD. Schedule an appointment as soon as possible for a visit in 2 days.    Specialty:  Family Medicine    Why:  Follow-up with your primary care doctor in the next 2-3 days for recheck.  Return to the emergency department for any worsening signs or symptoms.    Contact information:    [address]  [address]  [place] [address] [phone]          Follow up with Michele Orlando MD. Schedule an appointment as soon as possible for a visit in 1 week.    Specialty:  Gastroenterology    Why:  Follow-up with your gastroenterologist as soon as possible for recheck and further evaluation of her epigastric pain.  Return to the emergency department for any worsening signs or symptoms.    Contact information:    [address] [place] [address] [address] [place] [address] [phone]         These Medications        Disp Refills Start End    tizanidine (ZANAFLEX) 4 MG tablet 12 tablet 0 2017     Take 1 tablet (4 mg total) by mouth 3 (three) times daily as needed (muscle spasm). - Oral    Pharmacy: [facility] - [place] - [address]  Ph #: [phone]       sucralfate (CARAFATE) 1 gram tablet 20 tablet 0 2017     Take 1 tablet (1 g total) by mouth 4 (four) times daily. - Oral     [facility] [address] [address]  #: [phone]         [facility]     [facility] On Call Nurse Care Line - 24/7 Assistance  Registered nurses in the [facility] On Call Center provide clinical advisement, health education, appointment booking, and other advisory services.  Call for this free service at [phone].             Medications           Message regarding Medications     Verify the changes and/or additions to your medication regime listed below are the same as discussed with your clinician today.  If any of these changes or additions are incorrect, please notify your healthcare provider.        START taking these NEW medications        Refills    tizanidine (ZANAFLEX) 4 MG tablet 0    Sig: Take 1 tablet (4 mg total) by mouth 3 (three) times daily as needed (muscle spasm).    Class: Normal    Route: Oral    sucralfate (CARAFATE) 1 gram tablet 0    Sig: Take 1 tablet (1 g total) by mouth 4 (four) times daily.    Class: Normal    Route: Oral      These medications were administered today        Dose Freq    sodium chloride 0.9% bolus 1,000 mL 1,000 mL Once    Sig: Inject 1,000 mLs into the vein once.    Class: Normal    Route: Intravenous    ondansetron injection 4 mg 4 mg ED 1 Time    Sig: Inject 4 mg into the vein ED 1 Time.    Class: Normal    Route: Intravenous    GI cocktail (mylanta 30 mL, lidocaine 2 % viscous 10 mL, dicyclomine 10 mL) 50 mL  ED 1 Time    Sig: Take by mouth ED 1 Time.    Class: Normal    Route: Oral           Verify that the below list of medications is an accurate representation of the medications you are currently taking.  If none reported, the list may be blank. If incorrect, please contact your healthcare provider. Carry this list with you in case of emergency.           Current Medications     levothyroxine (SYNTHROID) 125 MCG tablet Take 125 mcg by mouth once daily.    norgestimate-ethinyl estradiol (ORTHO TRI-CYCLEN,TRI-SPRINTEC)  "0.18/0.215/0.25 mg-35 mcg (28) tablet Take 1 tablet by mouth once daily.    oxycodone-acetaminophen (PERCOCET) 5-325 mg per tablet Take 1-2 tablets by mouth every 4 to 6 hours as needed for Pain.    pantoprazole (PROTONIX) 20 MG tablet Take 1 tablet (20 mg total) by mouth once daily.    propranolol (INDERAL LA) 60 MG 24 hr capsule Take 60 mg by mouth once daily.    ondansetron injection 4 mg Inject 4 mg into the vein ED 1 Time.    sucralfate (CARAFATE) 1 gram tablet Take 1 tablet (1 g total) by mouth 4 (four) times daily.    tizanidine (ZANAFLEX) 4 MG tablet Take 1 tablet (4 mg total) by mouth 3 (three) times daily as needed (muscle spasm).           Clinical Reference Information           Your Vitals Were     BP Pulse Temp Resp Height Weight    143/77 54 98.3 °F (36.8 °C) (Oral) 16 5' 2" (1.575 m) 88 kg (194 lb)    Last Period SpO2 BMI          02/05/2017 100% 35.48 kg/m2        Allergies as of 2/19/2017        Reactions    Compazine [Prochlorperazine] Anaphylaxis    Caused musculature not to work properly    Amoxicillin-pot Clavulanate Other (See Comments)    Tightness in chest, fatigue     Doxycycline Rash    Latex, Natural Rubber Rash    Peanut Rash      Immunizations Administered on Date of Encounter - 2/19/2017     None      ED Micro, Lab, POCT     Start Ordered       Status Ordering Provider    02/19/17 1101 02/19/17 1101  Troponin I  STAT      Final result     02/19/17 1101 02/19/17 1101  Urinalysis Microscopic  Once      Final result     02/19/17 1100 02/19/17 1101  CBC W/ AUTO DIFFERENTIAL  STAT      Final result     02/19/17 1100 02/19/17 1101  Comp. Metabolic Panel  STAT      Final result     02/19/17 1100 02/19/17 1101  Lipase  Once      Final result     02/19/17 1100 02/19/17 1101  Urinalysis - Clean Catch  STAT      Final result       ED Imaging Orders     Start Ordered       Status Ordering Provider    02/19/17 1206 02/19/17 1205  CT Renal Stone Study ABD Pelvis WO  1 time imaging      Final result  "    02/19/17 1100 02/19/17 1101  X-ray chest PA and lateral  1 time imaging     Comments:  Abdominal pain    Final result     02/19/17 1100 02/19/17 1101  X-Ray Abdomen Flat And Erect  1 time imaging      Final result       Discharge References/Attachments     ABDOMINAL PAIN, ADULT (ENGLISH)    GASTRITIS (ADULT) (ENGLISH)    SUCRALFATE TABLETS (ENGLISH)    TIZANIDINE TABLETS OR CAPSULES (ENGLISH)    BACK SPRAIN/STRAIN (ENGLISH)      Your Scheduled Appointments     Mar 03, 2017  9:00 AM CST   Post OP with Christian Light MD   [facility]    [address] [id number] [phone]               [facility]  complies with applicable Federal civil rights laws and does not discriminate on the basis of race, color, national origin, age, disability, or sex.        Language Assistance Services     ATTENTION: Language assistance services are available, free of charge. Please call [phone].      ATENCIÓN: Si habla español, tiene a lebron disposición servicios gratuitos de asistencia lingüística. Llame al [phone].     CHÚ Ý: N?u b?n nói Ti?ng Vi?t, có các d?ch v? h? tr? ngôn ng? mi?n phí jazielh cho b?n. G?i s? [phone].
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
## 2017-02-20 ENCOUNTER — TELEPHONE (OUTPATIENT)
Dept: GASTROENTEROLOGY | Facility: CLINIC | Age: 47
End: 2017-02-20

## 2017-02-20 NOTE — TELEPHONE ENCOUNTER
----- Message from Anil Carmen sent at 2/20/2017  7:20 AM CST -----  Contact: Pt  Pt request call from nurse to schedule a ER F/U visit, I offered pt another provider and pt declined, please contact pt at [phone]

## 2017-02-23 ENCOUNTER — INITIAL CONSULT (OUTPATIENT)
Dept: GASTROENTEROLOGY | Facility: CLINIC | Age: 47
End: 2017-02-23
Payer: COMMERCIAL

## 2017-02-23 VITALS
HEART RATE: 60 BPM | DIASTOLIC BLOOD PRESSURE: 72 MMHG | WEIGHT: 197.56 LBS | SYSTOLIC BLOOD PRESSURE: 108 MMHG | HEIGHT: 62 IN | BODY MASS INDEX: 36.35 KG/M2

## 2017-02-23 DIAGNOSIS — R10.84 GENERALIZED ABDOMINAL PAIN: Primary | ICD-10-CM

## 2017-02-23 DIAGNOSIS — K59.00 CONSTIPATION, UNSPECIFIED CONSTIPATION TYPE: ICD-10-CM

## 2017-02-23 DIAGNOSIS — R11.0 NAUSEA: ICD-10-CM

## 2017-02-23 DIAGNOSIS — K76.9 LIVER LESION: ICD-10-CM

## 2017-02-23 DIAGNOSIS — R14.0 BLOATING: ICD-10-CM

## 2017-02-23 PROCEDURE — 1160F RVW MEDS BY RX/DR IN RCRD: CPT | Mod: S$GLB,,, | Performed by: NURSE PRACTITIONER

## 2017-02-23 PROCEDURE — 99214 OFFICE O/P EST MOD 30 MIN: CPT | Mod: S$GLB,,, | Performed by: NURSE PRACTITIONER

## 2017-02-23 PROCEDURE — 99999 PR PBB SHADOW E&M-EST. PATIENT-LVL III: CPT | Mod: PBBFAC,,, | Performed by: NURSE PRACTITIONER

## 2017-02-23 NOTE — MR AVS SNAPSHOT
[facility]  [address] [facility] [place] [address]  Phone: [phone]  Fax: [phone]                  Luana Lou   2017 3:20 PM   Initial consult    Description:  Female : 1970   Provider:  LULU Murphy   Department:  [facility]           Reason for Visit     Abdominal Pain     Emesis     Nausea                To Do List           Future Appointments        Provider Department Dept Phone    2017 3:20 PM LULU Murphy [facility] [phone]    3/3/2017 9:00 AM Christian Light MD '[facility] General Surgery [phone]    3/14/2017 3:00 PM LULU Murphy [facility] [phone]      Goals (5 Years of Data)     None      Follow-Up and Disposition     Return in about 2 weeks (around 3/9/2017).      [facility]     [facility] On Call Nurse Care Line -  Assistance  Registered nurses in the [facility] On Call Center provide clinical advisement, health education, appointment booking, and other advisory services.  Call for this free service at [phone].             Medications           Message regarding Medications     Verify the changes and/or additions to your medication regime listed below are the same as discussed with your clinician today.  If any of these changes or additions are incorrect, please notify your healthcare provider.        STOP taking these medications     oxycodone-acetaminophen (PERCOCET) 5-325 mg per tablet Take 1-2 tablets by mouth every 4 to 6 hours as needed for Pain.    tizanidine (ZANAFLEX) 4 MG tablet Take 1 tablet (4 mg total) by mouth 3 (three) times daily as needed (muscle spasm).           Verify that the below list of medications is an accurate representation of the medications you are currently taking.  If none reported, the list may be blank. If incorrect, please contact your healthcare provider. Carry this list with you in case of emergency.           Current  "Medications     levothyroxine (SYNTHROID) 125 MCG tablet Take 125 mcg by mouth once daily.    norgestimate-ethinyl estradiol (ORTHO TRI-CYCLEN,TRI-SPRINTEC) 0.18/0.215/0.25 mg-35 mcg (28) tablet Take 1 tablet by mouth once daily.    pantoprazole (PROTONIX) 20 MG tablet Take 1 tablet (20 mg total) by mouth once daily.    propranolol (INDERAL LA) 60 MG 24 hr capsule Take 60 mg by mouth once daily.    sucralfate (CARAFATE) 1 gram tablet Take 1 tablet (1 g total) by mouth 4 (four) times daily.           Clinical Reference Information           Your Vitals Were     BP Pulse Height Weight Last Period BMI    108/72 60 5' 2" (1.575 m) 89.6 kg (197 lb 8.5 oz) 02/05/2017 36.13 kg/m2      Blood Pressure          Most Recent Value    BP  108/72      Allergies as of 2/23/2017     Compazine [Prochlorperazine]    Amoxicillin-pot Clavulanate    Doxycycline    Latex, Natural Rubber    Peanut      Immunizations Administered on Date of Encounter - 2/23/2017     None      Instructions    Start Miralax once daily    Increase water     Continue Pantoprazole daily       Language Assistance Services     ATTENTION: Language assistance services are available, free of charge. Please call [phone].      ATENCIÓN: Si clay oden, tiene a lebron disposición servicios gratuitos de asistencia lingüística. Llame al [phone].     [facility] Ý: N?u b?n nói Ti?ng Vi?t, có các d?ch v? h? tr? ngôn ng? mi?n phí dành cho b?n. G?i s? [phone].         [facility] complies with applicable Federal civil rights laws and does not discriminate on the basis of race, color, national origin, age, disability, or sex.        "
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
## 2017-03-01 ENCOUNTER — OFFICE VISIT (OUTPATIENT)
Dept: SURGERY | Facility: CLINIC | Age: 47
End: 2017-03-01
Payer: COMMERCIAL

## 2017-03-01 VITALS
WEIGHT: 196 LBS | TEMPERATURE: 98 F | SYSTOLIC BLOOD PRESSURE: 147 MMHG | HEART RATE: 45 BPM | BODY MASS INDEX: 35.85 KG/M2 | DIASTOLIC BLOOD PRESSURE: 85 MMHG

## 2017-03-01 DIAGNOSIS — Z98.890 POST-OPERATIVE STATE: Primary | ICD-10-CM

## 2017-03-01 PROCEDURE — 99999 PR PBB SHADOW E&M-EST. PATIENT-LVL III: CPT | Mod: PBBFAC,,, | Performed by: SURGERY

## 2017-03-01 PROCEDURE — 99024 POSTOP FOLLOW-UP VISIT: CPT | Mod: S$GLB,,, | Performed by: SURGERY

## 2017-03-01 RX ORDER — CYCLOBENZAPRINE HCL 10 MG
10 TABLET ORAL 3 TIMES DAILY PRN
Qty: 20 TABLET | Refills: 0 | Status: SHIPPED | OUTPATIENT
Start: 2017-03-01 | End: 2017-03-10

## 2017-03-02 NOTE — PROGRESS NOTES
CLINIC VISIT    HISTORY OF PRESENT ILLNESS:  The patient returns now in followup.  She had a   laparoscopic cholecystectomy approximately 16 days ago.  She had stones, which   were impacted in her gallbladder and causing the symptoms.  In addition to that,   she did have 2.5 cm liver lesion, which appeared to be benign; however, the   biopsies are still pending.  Mainly postop, she had some diffuse kind of muscle   aches and some constipation.  She did see GI and thought it was mainly   constipation causing her symptoms.  The blood work done approximately a week   after the surgery showed basically normal liver function and normal CBC.  The   patient was doing well, eating well without much incisional pain and no wound   complaints until yesterday.  She does not remember doing any significant heavy   activity, but yesterday afternoon she developed this discomfort what sounds like   an abdominal wall between the epigastric trocar site and the middle subcostal   trocar site.  She felt like she gets like a spasm there and when she bent over   that area would be pinched and cause spasm and then sometimes proceeding that   would be like a tilting type of a pain, which would just last a few seconds and   improve.  She denies any wound drainage.  She is eating well.  No real GI or    complaints.  If she leans back or leans forward she gets discomfort there.  She   denies any fever or chills.    PHYSICAL EXAMINATION:  VITAL SIGNS:  Today, her temperature is 98.1, blood pressure 147/85 with pulses   in the 50s.  She is 88.9 kilograms.  GENERAL:  She is alert and oriented, in no acute distress.  CHEST:  Clear throughout.  I do not hear any rales.  She has good respiratory   excursion.  HEART:  Reveals normal rate and rhythm.  ABDOMEN:  Shows the wounds, incisions at the trocar sites are clean and dry.    All the Steri-Strips were removed.  She has normal bowel sounds.  Abdomen is   soft throughout.  In the area, the patient  points to basically between the two   upper medial trocar sites.  There is some minimal tenderness there, but there is   no guarding there, there is no heat or erythema.  The fascia was firm, all the   tenderness bases in the abdominal wall.  Deep to the abdominal wall there is no   real point tenderness.  She just feels like this area intermittently tightens   up.    IMPRESSRION:  A little over two weeks status post lap dominic with wounds clean   and dry, good GI function.  The patient is having what sounds like some spasms   and possibly some subcostal nerve irritation, which is causing paresthesia and   spasm like activity.    RECOMMENDATIONS:  Continue with the diet and encouraged light activity and   encouraged to ambulate.  I told her this is probably more of an inflammation or   irritated nerve and muscle spasm.  I recommended Aleve twice a day with meals   and I gave her a prescription for some Flexeril for the muscle spasm as needed.    She is to see me if this does not improve or if worsens.  I also told her that   we will call her with the results of the biopsy.          /hitesh [id number] yesi(s)        FAM/JAVIER  dd: 03/01/2017 19:16:34 (CST)  td: 03/02/2017 10:45:53 (CST)  Doc ID   #[id number]  Job ID #[id number]    CC:

## 2017-03-10 ENCOUNTER — HOSPITAL ENCOUNTER (EMERGENCY)
Facility: HOSPITAL | Age: 47
Discharge: HOME | End: 2017-03-10
Attending: EMERGENCY MEDICINE
Payer: COMMERCIAL

## 2017-03-10 ENCOUNTER — OFFICE VISIT (OUTPATIENT)
Dept: GASTROENTEROLOGY | Facility: CLINIC | Age: 47
End: 2017-03-10
Payer: COMMERCIAL

## 2017-03-10 ENCOUNTER — OFFICE VISIT (OUTPATIENT)
Dept: URGENT CARE | Facility: CLINIC | Age: 47
End: 2017-03-10
Payer: COMMERCIAL

## 2017-03-10 VITALS
RESPIRATION RATE: 16 BRPM | SYSTOLIC BLOOD PRESSURE: 135 MMHG | BODY MASS INDEX: 35.88 KG/M2 | DIASTOLIC BLOOD PRESSURE: 80 MMHG | HEIGHT: 62 IN | WEIGHT: 195 LBS | HEART RATE: 55 BPM | TEMPERATURE: 99 F | OXYGEN SATURATION: 99 %

## 2017-03-10 VITALS
SYSTOLIC BLOOD PRESSURE: 140 MMHG | HEART RATE: 50 BPM | TEMPERATURE: 98 F | OXYGEN SATURATION: 98 % | DIASTOLIC BLOOD PRESSURE: 100 MMHG

## 2017-03-10 VITALS
HEIGHT: 62 IN | BODY MASS INDEX: 36.23 KG/M2 | DIASTOLIC BLOOD PRESSURE: 84 MMHG | WEIGHT: 196.88 LBS | SYSTOLIC BLOOD PRESSURE: 124 MMHG | HEART RATE: 50 BPM

## 2017-03-10 DIAGNOSIS — R42 DIZZINESS: ICD-10-CM

## 2017-03-10 DIAGNOSIS — R53.1 GENERALIZED WEAKNESS: Primary | ICD-10-CM

## 2017-03-10 DIAGNOSIS — T44.7X5A: Primary | ICD-10-CM

## 2017-03-10 DIAGNOSIS — R11.0 NAUSEA: Primary | ICD-10-CM

## 2017-03-10 DIAGNOSIS — R00.1 BRADYCARDIA: ICD-10-CM

## 2017-03-10 DIAGNOSIS — K59.00 CONSTIPATION, UNSPECIFIED CONSTIPATION TYPE: ICD-10-CM

## 2017-03-10 DIAGNOSIS — H43.393 FLOATERS IN VISUAL FIELD, BILATERAL: ICD-10-CM

## 2017-03-10 DIAGNOSIS — H53.9 VISUAL DISTURBANCE: ICD-10-CM

## 2017-03-10 LAB
ALBUMIN SERPL BCP-MCNC: 3.7 G/DL
ALP SERPL-CCNC: 75 U/L
ALT SERPL W/O P-5'-P-CCNC: 36 U/L
ANION GAP SERPL CALC-SCNC: 8 MMOL/L
AST SERPL-CCNC: 25 U/L
B-HCG UR QL: NEGATIVE
BASOPHILS # BLD AUTO: 0.07 K/UL
BASOPHILS NFR BLD: 0.9 %
BILIRUB SERPL-MCNC: 0.8 MG/DL
BILIRUB UR QL STRIP: NEGATIVE
BNP SERPL-MCNC: 31 PG/ML
BUN SERPL-MCNC: 8 MG/DL
CALCIUM SERPL-MCNC: 9.2 MG/DL
CHLORIDE SERPL-SCNC: 106 MMOL/L
CLARITY UR: CLEAR
CO2 SERPL-SCNC: 26 MMOL/L
COLOR UR: YELLOW
CREAT SERPL-MCNC: 0.9 MG/DL
DIFFERENTIAL METHOD: NORMAL
EOSINOPHIL # BLD AUTO: 0.2 K/UL
EOSINOPHIL NFR BLD: 2.7 %
ERYTHROCYTE [DISTWIDTH] IN BLOOD BY AUTOMATED COUNT: 12.6 %
EST. GFR  (AFRICAN AMERICAN): >60 ML/MIN/1.73 M^2
EST. GFR  (NON AFRICAN AMERICAN): >60 ML/MIN/1.73 M^2
GLUCOSE SERPL-MCNC: 85 MG/DL
GLUCOSE UR QL STRIP: NEGATIVE
HCT VFR BLD AUTO: 42.2 %
HGB BLD-MCNC: 14.3 G/DL
HGB UR QL STRIP: NEGATIVE
INR PPP: 1
KETONES UR QL STRIP: NEGATIVE
LEUKOCYTE ESTERASE UR QL STRIP: NEGATIVE
LIPASE SERPL-CCNC: 17 U/L
LYMPHOCYTES # BLD AUTO: 1.9 K/UL
LYMPHOCYTES NFR BLD: 25.7 %
MCH RBC QN AUTO: 28.8 PG
MCHC RBC AUTO-ENTMCNC: 33.9 %
MCV RBC AUTO: 85 FL
MONOCYTES # BLD AUTO: 0.6 K/UL
MONOCYTES NFR BLD: 8.3 %
NEUTROPHILS # BLD AUTO: 4.7 K/UL
NEUTROPHILS NFR BLD: 62.4 %
NITRITE UR QL STRIP: NEGATIVE
PH UR STRIP: 7 [PH] (ref 5–8)
PLATELET # BLD AUTO: 277 K/UL
PMV BLD AUTO: 9.8 FL
POTASSIUM SERPL-SCNC: 3.9 MMOL/L
PROT SERPL-MCNC: 6.7 G/DL
PROT UR QL STRIP: NEGATIVE
PROTHROMBIN TIME: 10 SEC
RBC # BLD AUTO: 4.97 M/UL
SODIUM SERPL-SCNC: 140 MMOL/L
SP GR UR STRIP: <=1.005 (ref 1–1.03)
TROPONIN I SERPL DL<=0.01 NG/ML-MCNC: <0.006 NG/ML
TSH SERPL DL<=0.005 MIU/L-ACNC: 1.02 UIU/ML
URN SPEC COLLECT METH UR: ABNORMAL
UROBILINOGEN UR STRIP-ACNC: NEGATIVE EU/DL
WBC # BLD AUTO: 7.46 K/UL

## 2017-03-10 PROCEDURE — 84484 ASSAY OF TROPONIN QUANT: CPT

## 2017-03-10 PROCEDURE — 84443 ASSAY THYROID STIM HORMONE: CPT

## 2017-03-10 PROCEDURE — 1160F RVW MEDS BY RX/DR IN RCRD: CPT | Mod: S$GLB,,, | Performed by: NURSE PRACTITIONER

## 2017-03-10 PROCEDURE — 93010 ELECTROCARDIOGRAM REPORT: CPT | Mod: ,,, | Performed by: INTERNAL MEDICINE

## 2017-03-10 PROCEDURE — 99213 OFFICE O/P EST LOW 20 MIN: CPT | Mod: S$GLB,,, | Performed by: NURSE PRACTITIONER

## 2017-03-10 PROCEDURE — 85610 PROTHROMBIN TIME: CPT

## 2017-03-10 PROCEDURE — 93005 ELECTROCARDIOGRAM TRACING: CPT

## 2017-03-10 PROCEDURE — 81003 URINALYSIS AUTO W/O SCOPE: CPT

## 2017-03-10 PROCEDURE — 99999 PR PBB SHADOW E&M-EST. PATIENT-LVL III: CPT | Mod: PBBFAC,,,

## 2017-03-10 PROCEDURE — 85025 COMPLETE CBC W/AUTO DIFF WBC: CPT

## 2017-03-10 PROCEDURE — 83880 ASSAY OF NATRIURETIC PEPTIDE: CPT

## 2017-03-10 PROCEDURE — 96360 HYDRATION IV INFUSION INIT: CPT

## 2017-03-10 PROCEDURE — 99284 EMERGENCY DEPT VISIT MOD MDM: CPT | Mod: 25

## 2017-03-10 PROCEDURE — 81025 URINE PREGNANCY TEST: CPT

## 2017-03-10 PROCEDURE — 99214 OFFICE O/P EST MOD 30 MIN: CPT | Mod: S$GLB,,, | Performed by: NURSE PRACTITIONER

## 2017-03-10 PROCEDURE — 83690 ASSAY OF LIPASE: CPT

## 2017-03-10 PROCEDURE — 80053 COMPREHEN METABOLIC PANEL: CPT

## 2017-03-10 PROCEDURE — 25000003 PHARM REV CODE 250: Performed by: EMERGENCY MEDICINE

## 2017-03-10 PROCEDURE — 99999 PR PBB SHADOW E&M-EST. PATIENT-LVL III: CPT | Mod: PBBFAC,,, | Performed by: NURSE PRACTITIONER

## 2017-03-10 RX ORDER — PROPRANOLOL HYDROCHLORIDE 10 MG/1
10 TABLET ORAL 2 TIMES DAILY
Qty: 60 TABLET | Refills: 1 | Status: SHIPPED | OUTPATIENT
Start: 2017-03-10 | End: 2017-09-21

## 2017-03-10 RX ORDER — PANTOPRAZOLE SODIUM 20 MG/1
20 TABLET, DELAYED RELEASE ORAL DAILY
COMMUNITY
End: 2017-09-21

## 2017-03-10 RX ADMIN — SODIUM CHLORIDE 1000 ML: 0.9 INJECTION, SOLUTION INTRAVENOUS at 11:03

## 2017-03-10 NOTE — ED NOTES
Pt reports Nausea and dizziness this morning. She was sent from [facility] for further evaluation. Pt taking propanolol at home and her HR was also bradycardic in the 40's at the clinic. Pt reports feeling much better now that she is in the ER and has no complaints at this time.   Appearance: Sitting up in bed with no acute distress noted.     HEENT: Atraumatic. Mucous membranes moist and intact.   Skin: Skin is warm and dry with good skin turgor; Skin is intact; color consistent with ethnicity. Mucous membranes are moist.   Musculoskeletal: Moves all extremities well in full range of motion. No obvious deformities or swelling noted.   Respiratory: Airway open and patent. Respirations spontaneous, even and unlabored. No accessory muscles in use. Pt reports she feels some chest congested, lungs clear throughout on auscultation.  Cardiac: Sinus Bradycardia.  No peripheral edema noted.    Abdomen: Soft, non-tender to palpation. No distention noted. Pt obese. Small healed incisions to navel and RUQ from gallbladder removal 1 month ago. Pt reports intermittent nausea.  Neurologic: Alert, awake, and appropriate. Normal speech. No acute neurological deficits noted. Face exhibits symmetrical expression, hand grasps equal and even bilaterally, normal sensation noted to all extremities and face when touched by a finger. Pt reported some dizziness PTA at clinic that has since resolved.
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
## 2017-03-10 NOTE — DISCHARGE INSTRUCTIONS
Bradycardia    When your heart rate is slow, less than 60 beats per minute, it is called bradycardia. Bradycardia can be normal, caused by medicines, or a sign of a disease. The slow heart rate may not be constant; it can come and go. It is a concern when it is very low, or you have symptoms.  Signs and symptoms  The following are signs and symptoms of bradycardia:  · Heart rate less than 60 per minute  · Dizziness or feeling lightheaded  · Weakness  · Trouble breathing  · Fainting  · Sleepiness  · More trouble exercising than usual because of fatigue  · Confusion or trouble concentrating  Causes  There are many causes of bradycardia. Some can be related to your heart, but some may be related to other factors.  Non-heart-related causes:  · Advanced age  · Side effect of certain medicines (such as beta-blockers, calcium channel blockers, digitalis, antiarrhythmic medicines like amiodarone, clonidine, lithium)  · Medical conditions such as hypoglycemia (low blood sugar), hypothyroidism (low thyroid), electrolyte disorder,  hypothermia, sleep apnea  · Athletes, especially long-distance runners, may have a slow heart rate. This can be normal.  · Sleep apnea  · Brain injury such as stroke or bleeding inside the brain  Heart-related causes:  · Coronary artery disease (angina or prior heart attack, also known as acute myocardial infarction, or AMI)  · Heart valve disease  · Heart muscle disease (cardiomyopathy)  · Congestive heart failure  · Sick sinus syndrome, which is when your heart's natural pacemaker is no longer working properly  · Diseases that infiltrate the heart such as sarcoid  · Heart infections  Sometimes the cause for the arrhythmia cannot be found.  Bradycardia that causes symptoms is sometimes reversible, and can be treated with medicines. When more severe bradycardia persists, a pacemaker is generally recommended. When the bradycardia does not cause symptoms, your doctor may decide to evaluate it in his  or her office.  Home care  The following will help you care for yourself at home:  · Resume your usual activities when you are feeling back to normal.  · If you develop any of the symptoms below during exertion, then you should not exert yourself until evaluated further by your doctor.  · Work with your doctor on any needed lifestyle changes, such as changing your diet, stopping smoking if you are a smoker, and a planned exercise program.  Follow-up care  Follow up with your doctor, or as advised.  Call 911  Call 911 if any of the following occur:  · Chest pain  · Trouble breathing  · Slow heart rate with dizziness or lightheadedness  · Fainting or loss of consciousness  · Chest, shoulder, arm, neck, or back pain  · Slow heart rate (under 50 beats per minute) if associated with symptoms  When to seek medical advice  Get prompt medical attention if any of the following occur:  · Occasional weakness, dizziness, or lightheadedness  Date Last Reviewed: 4/25/2016  © 7001-7396 [employer]. [address] [place] [address]. All rights reserved. This information is not intended as a substitute for professional medical care. Always follow your healthcare professional's instructions.          Managing Dizziness (Vertigo) with Medicines    Although medicines can't cure your problem, they can help control symptoms. Your doctor may prescribe medicines for a few weeks and then taper them off. Always take your medicine as prescribed. Never share your medicine with others.  Contact your healthcare provider right away if you have side effects from your medicines.   How medicines can help  · Treat infection or inflammation. If you have an infection caused by bacteria, your doctor can prescribe antibiotics.  · Limit conflicting balance signals. These medicines are often in pill form.  · Ease nausea. Suppositories, pills, or shots can reduce vomiting.  · Reduce pressure in the canals. Diuretics can be used to  treat Meniere's disease. These medicines help your body get rid of extra fluid.  · Ease other symptoms. Other medicines can help ease depression and anxiety caused by living with dizziness or fainting.  Date Last Reviewed: 11/1/2016  © 9950-3101 [employer]. [address] [place] [address], [place] [address]. All rights reserved. This information is not intended as a substitute for professional medical care. Always follow your healthcare professional's instructions.

## 2017-03-10 NOTE — MR AVS SNAPSHOT
[facility]  [address] [facility] [place] [address]  Phone: [phone]  Fax: [phone]                  Luana Lou   3/10/2017 7:40 AM   Office Visit    Description:  Female : 1970   Provider:  LULU Murphy   Department:  [facility]           Reason for Visit     Dizziness     Nausea                To Do List           Future Appointments        Provider Department Dept Phone    3/14/2017 3:00 PM [facility] Murphy [facility] [phone]      Goals (5 Years of Data)     None      [facility]     [facility] Care [facility] -  Assistance  Registered nurses in the [facility] On Call Center provide clinical advisement, health education, appointment booking, and other advisory services.  Call for this free service at [phone].             Medications           Message regarding Medications     Verify the changes and/or additions to your medication regime listed below are the same as discussed with your clinician today.  If any of these changes or additions are incorrect, please notify your healthcare provider.        STOP taking these medications     cyclobenzaprine (FLEXERIL) 10 MG tablet Take 1 tablet (10 mg total) by mouth 3 (three) times daily as needed for Muscle spasms.           Verify that the below list of medications is an accurate representation of the medications you are currently taking.  If none reported, the list may be blank. If incorrect, please contact your healthcare provider. Carry this list with you in case of emergency.           Current Medications     levothyroxine (SYNTHROID) 125 MCG tablet Take 125 mcg by mouth once daily.    norgestimate-ethinyl estradiol (ORTHO TRI-CYCLEN,TRI-SPRINTEC) 0.18/0.215/0.25 mg-35 mcg (28) tablet Take 1 tablet by mouth once daily.    pantoprazole (PROTONIX) 20 MG tablet Take 1 tablet (20 mg total) by mouth once daily.    propranolol (INDERAL LA) 60 MG 24 hr capsule  "Take 60 mg by mouth once daily.           Clinical Reference Information           Your Vitals Were     BP Pulse Height Weight Last Period BMI    124/84 50 5' 2.4" (1.585 m) 89.3 kg (196 lb 13.9 oz) 02/05/2017 35.55 kg/m2      Blood Pressure          Most Recent Value    BP  124/84      Allergies as of 3/10/2017     Compazine [Prochlorperazine]    Amoxicillin-pot Clavulanate    Doxycycline    Latex, Natural Rubber    Peanut      Immunizations Administered on Date of Encounter - 3/10/2017     None      Language Assistance Services     ATTENTION: Language assistance services are available, free of charge. Please call [phone].      ATENCIÓN: Si habla español, tiene a lebron disposición servicios gratuitos de asistencia lingüística. Llame al [phone].     ELBA Ý: N?u b?n nói Ti?ng Vi?t, có các d?ch v? h? tr? ngôn ng? mi?n phí dành cho b?n. G?i s? [phone].         [facility] complies with applicable Federal civil rights laws and does not discriminate on the basis of race, color, national origin, age, disability, or sex.        "

## 2017-03-10 NOTE — PROGRESS NOTES
"Subjective:      Patient ID: Luana Lou is a 47 y.o. female.    Chief Complaint: Dizziness    HPI Comments: Mrs. Lou presents to Urgent Care today with complaints of weakness, dizziness, vision changes, and some confusion. She had a post-op follow up appointment with GI today and was sent to Urgent Care for evaluation of above complaints. Symptoms were severe and started suddenly after waking up. Improved somewhat, then became severe again while waiting to be seen by GI. States, "I can see my pulse in my vision and when I was on the elevator I felt kind of confused like I could hear people talking but I had to think really hard about what they were saying." Still having the "throbbing" in her visual field, but it isn't as obvious as it was earlier. Seems to affect both eyes. Also stated that she heard "water rushing" but this has subsided since presenting to Urgent Care. We were notified by the registration desk that Mrs. Lou did not hear when they were trying to talk to her. She reports that she felt like crying for no reason --  reported that patient was tearful during Urgent Care check-in. She complains of weakness all over, especially in legs. She fainted while having blood drawn a while back and states that today she feels just like she did before passing out. Also had chest congestion this morning. No chest pain or palpitations. + nausea. No vomiting. No nasal congestion, fever, or chills. Denies any recent medication changes. Symptoms were severe this morning, improved some on the way here, then worsened again when she was upstairs waiting to see GI.     Review of Systems   HENT: Negative for congestion.    Eyes: Positive for visual disturbance (see "pulse throbbing in vision").   Respiratory: Negative for shortness of breath.    Gastrointestinal: Positive for nausea. Negative for abdominal pain and vomiting.   Neurological: Positive for dizziness, weakness and " light-headedness.       Objective:   BP (!) 140/100  Pulse (!) 50  Temp 97.7 °F (36.5 °C) (Tympanic)   LMP 02/05/2017  SpO2 98%  Physical Exam   Constitutional: She is oriented to person, place, and time. She appears well-developed and well-nourished. No distress.   HENT:   Head: Normocephalic and atraumatic.   Mouth/Throat: Oropharynx is clear and moist.   Eyes: EOM are normal. Pupils are equal, round, and reactive to light.   Neck: Normal range of motion. Neck supple.   Cardiovascular: Regular rhythm and normal heart sounds.  Bradycardia present.    Pulmonary/Chest: Effort normal and breath sounds normal. No respiratory distress.   Musculoskeletal: Normal range of motion.   Lymphadenopathy:     She has no cervical adenopathy.   Neurological: She is alert and oriented to person, place, and time. She has normal strength. No sensory deficit. Coordination and gait normal. GCS eye subscore is 4. GCS verbal subscore is 5. GCS motor subscore is 6.   Slow to answer questions but answers appropriately.   Skin: Skin is warm, dry and intact. She is not diaphoretic.   Nursing note and vitals reviewed.    Assessment:      1. Generalized weakness    2. Floaters in visual field, bilateral    3. Dizziness       Plan:   Generalized weakness    Floaters in visual field, bilateral    Dizziness    Due to severe and sudden onset of sypmtoms, I feel that Mrs. Lou should be evaluated in ER. She agrees. Declines ambulance transport, however, her  was nearby and was able to pick her up. She will go to [facility].

## 2017-03-10 NOTE — ED AVS SNAPSHOT
[facility] -   [address] [facility] [address]               Luana Lou   3/10/2017  9:22 AM   ED    Description:  Female : 1970   Department:  [facility]           Your Care was Coordinated By:     Provider Role From To    Clint Davey Jr., MD Attending Provider 03/10/17 1006 --      Reason for Visit     Dizziness           Diagnoses this Visit        Comments    Adverse reaction to propranolol, initial encounter    -  Primary     Dizziness         Bradycardia           ED Disposition     ED Disposition Condition Comment    Discharge  Regarding DIZZINESS, I recommended strategies to prevent or manage symptoms such as: avoiding sudden head movements; refraining from bending over at the waist; keeping head raised when lying down (i,e., place pillows under upper back and head or rest in  a recliner); avoid staying in bed for long periods of time or if bedrest is required, change position often when in bed; and always wear a helmet when riding bikes or playing sports. Instructed patient to contact primary care provider if symptoms worsen,  falls become more frequent, or any questions arise regarding condition and/or treatment.  Patient instructed to return to the emergency department if: a headache develops and becomes persistent and severe: notice any increase in weakness or visual hall es; experience any shortness of breath or chest pain; and/or develop hearing problems or tinnitus.     Follow up with PCP/Cardiology for reevaluation of dizziness and bradycardia.           To Do List           Follow-up Information     Follow up with Nallely Mejia MD. Schedule an appointment as soon as possible for a visit in 1 week.    Specialty:  Family Medicine    Contact information:    [address]  [address]  [facility] [address] [phone]          Follow up with [facility]. Schedule an appointment as soon as  possible for a visit in 1 week.    Specialty:  Cardiology    Contact information:    [address]  [facility] [address] [phone]    Additional information:    (off [facility]) 3rd floor        Follow up with [facility].    Specialty:  Emergency Medicine    Why:  As needed, If symptoms worsen    Contact information:    [address]  [facility] [address] [phone]       These Medications        Disp Refills Start End    propranolol (INDERAL) 10 MG tablet 60 tablet 1 3/10/2017 3/10/2018    Take 1 tablet (10 mg total) by mouth 2 (two) times daily. - Oral    Pharmacy: [facility] [address] [facility] [place] [address] [address] Ph #: [phone]         [facility]     [facility] Care [facility] - 24/7 Assistance  Registered nurses in the [facility] provide clinical advisement, health education, appointment booking, and other advisory services.  Call for this free service at [phone].             Medications           Message regarding Medications     Verify the changes and/or additions to your medication regime listed below are the same as discussed with your clinician today.  If any of these changes or additions are incorrect, please notify your healthcare provider.        START taking these NEW medications        Refills    propranolol (INDERAL) 10 MG tablet 1    Sig: Take 1 tablet (10 mg total) by mouth 2 (two) times daily.    Class: Print    Route: Oral      These medications were administered today        Dose Freq    sodium chloride 0.9% bolus 1,000 mL 1,000 mL Once    Sig: Inject 1,000 mLs into the vein once.    Class: Normal    Route: Intravenous      STOP taking these medications     propranolol (INDERAL LA) 60 MG 24 hr capsule Take 60 mg by mouth once daily.           Verify that the below list of medications is an accurate representation of the medications you are currently taking.  If none reported, the  "list may be blank. If incorrect, please contact your healthcare provider. Carry this list with you in case of emergency.           Current Medications     levothyroxine (SYNTHROID) 125 MCG tablet Take 125 mcg by mouth once daily.    norgestimate-ethinyl estradiol (ORTHO TRI-CYCLEN,TRI-SPRINTEC) 0.18/0.215/0.25 mg-35 mcg (28) tablet Take 1 tablet by mouth once daily.    pantoprazole (PROTONIX) 20 MG tablet Take 20 mg by mouth once daily.    propranolol (INDERAL) 10 MG tablet Take 1 tablet (10 mg total) by mouth 2 (two) times daily.           Clinical Reference Information           Your Vitals Were     BP Pulse Temp Resp Height Weight    135/80 55 98.7 °F (37.1 °C) (Oral) 16 5' 2" (1.575 m) 88.5 kg (195 lb)    Last Period SpO2 BMI          02/05/2017 99% 35.67 kg/m2        Allergies as of 3/10/2017        Reactions    Compazine [Prochlorperazine] Anaphylaxis    Caused musculature not to work properly    Amoxicillin-pot Clavulanate Other (See Comments)    Tightness in chest, fatigue     Doxycycline Rash    Latex, Natural Rubber Rash    Peanut Rash      Immunizations Administered on Date of Encounter - 3/10/2017     None      ED Micro, Lab, POCT     Start Ordered       Status Ordering Provider    03/10/17 1036 03/10/17 1035  Urinalysis  STAT      Final result     03/10/17 1036 03/10/17 1035  Pregnancy, urine rapid  STAT      Final result     03/10/17 1033 03/10/17 1032  TSH  STAT      Final result     03/10/17 1031 03/10/17 1031  Lipase  STAT      Final result     03/10/17 1029 03/10/17 1031  CBC auto differential  STAT      Final result     03/10/17 1029 03/10/17 1031  Comprehensive metabolic panel  STAT      Final result     03/10/17 1029 03/10/17 1031  Protime-INR  STAT      Final result     03/10/17 1029 03/10/17 1031  Troponin I  Now then every 3 hours     Comments:  PLEASE REVIEW ORDER START TIME BEFORE MARKING SPECIMEN COLLECTED.   Start Status   03/10/17 1029 Final result   03/10/17 1329 Acknowledged       " Acknowledged     03/10/17 1029 03/10/17 1031  B-Type natriuretic peptide (BNP)  STAT      In process       ED Imaging Orders     Start Ordered       Status Ordering Provider    03/10/17 1030 03/10/17 1031  X-Ray Chest AP Portable  1 time imaging      Final result     03/10/17 1030 03/10/17 1031  X-Ray Abdomen Flat And Erect  1 time imaging      Final result         Discharge Instructions         Bradycardia    When your heart rate is slow, less than 60 beats per minute, it is called bradycardia. Bradycardia can be normal, caused by medicines, or a sign of a disease. The slow heart rate may not be constant; it can come and go. It is a concern when it is very low, or you have symptoms.  Signs and symptoms  The following are signs and symptoms of bradycardia:  · Heart rate less than 60 per minute  · Dizziness or feeling lightheaded  · Weakness  · Trouble breathing  · Fainting  · Sleepiness  · More trouble exercising than usual because of fatigue  · Confusion or trouble concentrating  Causes  There are many causes of bradycardia. Some can be related to your heart, but some may be related to other factors.  Non-heart-related causes:  · Advanced age  · Side effect of certain medicines (such as beta-blockers, calcium channel blockers, digitalis, antiarrhythmic medicines like amiodarone, clonidine, lithium)  · Medical conditions such as hypoglycemia (low blood sugar), hypothyroidism (low thyroid), electrolyte disorder,  hypothermia, sleep apnea  · Athletes, especially long-distance runners, may have a slow heart rate. This can be normal.  · Sleep apnea  · Brain injury such as stroke or bleeding inside the brain  Heart-related causes:  · Coronary artery disease (angina or prior heart attack, also known as acute myocardial infarction, or AMI)  · Heart valve disease  · Heart muscle disease (cardiomyopathy)  · Congestive heart failure  · Sick sinus syndrome, which is when your heart's natural pacemaker is no longer working  properly  · Diseases that infiltrate the heart such as sarcoid  · Heart infections  Sometimes the cause for the arrhythmia cannot be found.  Bradycardia that causes symptoms is sometimes reversible, and can be treated with medicines. When more severe bradycardia persists, a pacemaker is generally recommended. When the bradycardia does not cause symptoms, your doctor may decide to evaluate it in his or her office.  Home care  The following will help you care for yourself at home:  · Resume your usual activities when you are feeling back to normal.  · If you develop any of the symptoms below during exertion, then you should not exert yourself until evaluated further by your doctor.  · Work with your doctor on any needed lifestyle changes, such as changing your diet, stopping smoking if you are a smoker, and a planned exercise program.  Follow-up care  Follow up with your doctor, or as advised.  Call 911  Call 911 if any of the following occur:  · Chest pain  · Trouble breathing  · Slow heart rate with dizziness or lightheadedness  · Fainting or loss of consciousness  · Chest, shoulder, arm, neck, or back pain  · Slow heart rate (under 50 beats per minute) if associated with symptoms  When to seek medical advice  Get prompt medical attention if any of the following occur:  · Occasional weakness, dizziness, or lightheadedness  Date Last Reviewed: 4/25/2016 © 2000-2016 [employer]. [address] [place] [address], [place] [address]. All rights reserved. This information is not intended as a substitute for professional medical care. Always follow your healthcare professional's instructions.          Managing Dizziness (Vertigo) with Medicines    Although medicines can't cure your problem, they can help control symptoms. Your doctor may prescribe medicines for a few weeks and then taper them off. Always take your medicine as prescribed. Never share your medicine with others.  Contact your healthcare provider right  away if you have side effects from your medicines.   How medicines can help  · Treat infection or inflammation. If you have an infection caused by bacteria, your doctor can prescribe antibiotics.  · Limit conflicting balance signals. These medicines are often in pill form.  · Ease nausea. Suppositories, pills, or shots can reduce vomiting.  · Reduce pressure in the canals. Diuretics can be used to treat Meniere's disease. These medicines help your body get rid of extra fluid.  · Ease other symptoms. Other medicines can help ease depression and anxiety caused by living with dizziness or fainting.  Date Last Reviewed: 11/1/2016 © 2000-2016 [employer]. [address] [place] [address], [place] [address]. All rights reserved. This information is not intended as a substitute for professional medical care. Always follow your healthcare professional's instructions.           [facility] complies with applicable Federal civil rights laws and does not discriminate on the basis of race, color, national origin, age, disability, or sex.        Language Assistance Services     ATTENTION: Language assistance services are available, free of charge. Please call [phone].      ATENCIÓN: Si habla cecille, tiene a lebron disposición servicios gratuitos de asistencia lingüística. Llame al [phone].     ELBA Ý: N?u b?n nói Ti?ng Vi?t, có các d?ch v? h? tr? ngôn ng? mi?n phí dành cho b?n. G?i s? [phone].

## 2017-09-21 ENCOUNTER — HOSPITAL ENCOUNTER (OUTPATIENT)
Dept: RADIOLOGY | Facility: HOSPITAL | Age: 47
Discharge: HOME | End: 2017-09-21
Attending: NURSE PRACTITIONER
Payer: COMMERCIAL

## 2017-09-21 ENCOUNTER — OFFICE VISIT (OUTPATIENT)
Dept: OBSTETRICS AND GYNECOLOGY | Facility: CLINIC | Age: 47
End: 2017-09-21
Payer: COMMERCIAL

## 2017-09-21 VITALS
HEIGHT: 62 IN | DIASTOLIC BLOOD PRESSURE: 70 MMHG | SYSTOLIC BLOOD PRESSURE: 114 MMHG | WEIGHT: 205 LBS | BODY MASS INDEX: 37.73 KG/M2

## 2017-09-21 DIAGNOSIS — Z12.39 BREAST CANCER SCREENING: ICD-10-CM

## 2017-09-21 DIAGNOSIS — Z00.00 PREVENTATIVE HEALTH CARE: Primary | ICD-10-CM

## 2017-09-21 DIAGNOSIS — Z01.419 WELL WOMAN EXAM WITH ROUTINE GYNECOLOGICAL EXAM: ICD-10-CM

## 2017-09-21 DIAGNOSIS — Z00.00 PREVENTATIVE HEALTH CARE: ICD-10-CM

## 2017-09-21 PROCEDURE — 88175 CYTOPATH C/V AUTO FLUID REDO: CPT

## 2017-09-21 PROCEDURE — 77067 SCR MAMMO BI INCL CAD: CPT | Mod: TC

## 2017-09-21 PROCEDURE — 99999 PR PBB SHADOW E&M-EST. PATIENT-LVL III: CPT | Mod: PBBFAC,,, | Performed by: NURSE PRACTITIONER

## 2017-09-21 PROCEDURE — 99203 OFFICE O/P NEW LOW 30 MIN: CPT | Mod: S$GLB,,, | Performed by: NURSE PRACTITIONER

## 2017-09-21 PROCEDURE — 3008F BODY MASS INDEX DOCD: CPT | Mod: S$GLB,,, | Performed by: NURSE PRACTITIONER

## 2017-09-21 PROCEDURE — 77067 SCR MAMMO BI INCL CAD: CPT | Mod: 26,,, | Performed by: RADIOLOGY

## 2017-09-21 RX ORDER — PROPRANOLOL HYDROCHLORIDE 20 MG/1
20 TABLET ORAL DAILY
COMMUNITY
Start: 2017-09-13 | End: 2020-08-28 | Stop reason: SDUPTHER
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
## 2017-10-02 ENCOUNTER — OFFICE VISIT (OUTPATIENT)
Dept: UROLOGY | Facility: CLINIC | Age: 47
End: 2017-10-02
Payer: COMMERCIAL

## 2017-10-02 VITALS — WEIGHT: 203.69 LBS | BODY MASS INDEX: 37.26 KG/M2

## 2017-10-02 DIAGNOSIS — N32.81 OAB (OVERACTIVE BLADDER): Primary | ICD-10-CM

## 2017-10-02 LAB
BILIRUB SERPL-MCNC: NORMAL MG/DL
BLOOD URINE, POC: NORMAL
COLOR, POC UA: YELLOW
GLUCOSE UR QL STRIP: NORMAL
KETONES UR QL STRIP: NORMAL
LEUKOCYTE ESTERASE URINE, POC: NORMAL
MICROSCOPIC COMMENT: NORMAL
NITRITE, POC UA: NORMAL
PH, POC UA: 6
POC RESIDUAL URINE VOLUME: 295 ML (ref 0–100)
PROTEIN, POC: NORMAL
RBC #/AREA URNS AUTO: 0 /HPF (ref 0–4)
SPECIFIC GRAVITY, POC UA: 1.01
SQUAMOUS #/AREA URNS AUTO: 0 /HPF
UROBILINOGEN, POC UA: NORMAL
WBC #/AREA URNS AUTO: 0 /HPF (ref 0–5)

## 2017-10-02 PROCEDURE — 99999 PR PBB SHADOW E&M-EST. PATIENT-LVL III: CPT | Mod: PBBFAC,,, | Performed by: UROLOGY

## 2017-10-02 PROCEDURE — 99244 OFF/OP CNSLTJ NEW/EST MOD 40: CPT | Mod: 25,S$GLB,, | Performed by: UROLOGY

## 2017-10-02 PROCEDURE — 87086 URINE CULTURE/COLONY COUNT: CPT

## 2017-10-02 PROCEDURE — 51798 US URINE CAPACITY MEASURE: CPT | Mod: S$GLB,,, | Performed by: UROLOGY

## 2017-10-02 PROCEDURE — 81001 URINALYSIS AUTO W/SCOPE: CPT

## 2017-10-02 PROCEDURE — 81002 URINALYSIS NONAUTO W/O SCOPE: CPT | Mod: S$GLB,,, | Performed by: UROLOGY

## 2017-10-02 RX ORDER — OXYBUTYNIN CHLORIDE 5 MG/1
5 TABLET ORAL 3 TIMES DAILY
Qty: 90 TABLET | Refills: 11 | Status: SHIPPED | OUTPATIENT
Start: 2017-10-02 | End: 2019-08-21

## 2017-10-02 NOTE — LETTER
October 2, 2017      Karyn Powell NP  [address] [place] [address] [address] [place] [id number]           [facility]  [address] [place] [address] [address] [place] [address]  Phone: [phone]  Fax: [phone]          Patient: Luana Lou   MR Number: [id number]   YOB: 1970   Date of Visit: 10/2/2017       Dear Karyn Powell:    Thank you for referring Luana Lou to me for evaluation. Attached you will find relevant portions of my assessment and plan of care.    If you have questions, please do not hesitate to call me. I look forward to following Luana Lou along with you.    Sincerely,    Abel Olivo IV, MD    Enclosure  CC:  No Recipients    If you would like to receive this communication electronically, please contact [email or web address] or [phone] to request more information on [employer] access.    For providers and/or their staff who would like to refer a patient to [facility], please contact us through our one-stop-shop provider referral line, [facility], at [phone].    If you feel you have received this communication in error or would no longer like to receive these types of communications, please e-mail [email or web address]
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
## 2017-10-03 LAB — BACTERIA UR CULT: NO GROWTH

## 2017-10-03 RX ORDER — NORGESTIMATE AND ETHINYL ESTRADIOL 7DAYSX3 28
1 KIT ORAL DAILY
Qty: 28 TABLET | Refills: 6 | Status: SHIPPED | OUTPATIENT
Start: 2017-10-03 | End: 2018-04-05 | Stop reason: SDUPTHER

## 2017-10-03 NOTE — TELEPHONE ENCOUNTER
----- Message from Jaquan Headley sent at 10/2/2017  4:12 PM CDT -----  Contact: Pt   Pt called need her birth control  Called in to pharmacy pt callback number is [phone]        ..  [facility] - [place] - [address]  [address] [place] [address]  Phone: [phone] Fax: [phone]

## 2018-04-05 ENCOUNTER — TELEPHONE (OUTPATIENT)
Dept: OBSTETRICS AND GYNECOLOGY | Facility: CLINIC | Age: 48
End: 2018-04-05

## 2018-04-05 RX ORDER — NORGESTIMATE AND ETHINYL ESTRADIOL 7DAYSX3 28
1 KIT ORAL DAILY
Qty: 28 TABLET | Refills: 6 | Status: SHIPPED | OUTPATIENT
Start: 2018-04-05 | End: 2018-12-08 | Stop reason: SDUPTHER

## 2018-04-05 NOTE — TELEPHONE ENCOUNTER
----- Message from Chriss Roman sent at 4/5/2018 12:09 PM CDT -----  Pt is requesting a call from nurse to discuss a refill on birth control before her apt .          Please call pt back at [phone]        .  [facility] - [place] - [address]  [address] [place] [address]  Phone: [phone] Fax: [phone]

## 2018-04-05 NOTE — TELEPHONE ENCOUNTER
Spoke with pt and informed that birth control refill has been taken care of, with six additional refills.  Pt voiced understanding.

## 2018-04-05 NOTE — TELEPHONE ENCOUNTER
Pt is not due for her annual until Sept. She is requesting a refill on her birth control. Please Advise.

## 2018-10-18 ENCOUNTER — OFFICE VISIT (OUTPATIENT)
Dept: OPHTHALMOLOGY | Facility: CLINIC | Age: 48
End: 2018-10-18
Payer: COMMERCIAL

## 2018-10-18 DIAGNOSIS — H52.4 BILATERAL PRESBYOPIA: ICD-10-CM

## 2018-10-18 DIAGNOSIS — Z01.00 ENCOUNTER FOR EYE EXAM: Primary | ICD-10-CM

## 2018-10-18 PROCEDURE — 92004 COMPRE OPH EXAM NEW PT 1/>: CPT | Mod: S$GLB,,, | Performed by: OPTOMETRIST

## 2018-10-18 PROCEDURE — 92015 DETERMINE REFRACTIVE STATE: CPT | Mod: S$GLB,,, | Performed by: OPTOMETRIST

## 2018-10-18 PROCEDURE — 99999 PR PBB SHADOW E&M-EST. PATIENT-LVL II: CPT | Mod: PBBFAC,,, | Performed by: OPTOMETRIST

## 2018-10-18 NOTE — PATIENT INSTRUCTIONS
Encounter for eye exam     Bilateral presbyopia        Slightly elevated Intraocular Pressure.  Normal Ocular Nerve Heads.     Dispense Final Rx for glasses.  RTC 6 months IOP ck  Discussed above and answered questions.

## 2018-10-18 NOTE — PROGRESS NOTES
HPI     Decrease near visual x 3 months.  Hard to read text message on phone all sizes.  Eyes feel tired x 3 months.  Patient states vision is clear sometimes and sometimes now.  New patient last eye exam 1 year.  Update glasses RX.    Last edited by Robyn Arndt on 10/18/2018  3:01 PM. (History)            Assessment /Plan     For exam results, see Encounter Report.    Encounter for eye exam    Bilateral presbyopia      Slightly elevated Intraocular Pressure.  Normal Ocular Nerve Heads.    Dispense Final Rx for glasses.  RTC 6 months IOP ck  Discussed above and answered questions.

## 2018-10-19 ENCOUNTER — OFFICE VISIT (OUTPATIENT)
Dept: OBSTETRICS AND GYNECOLOGY | Facility: CLINIC | Age: 48
End: 2018-10-19
Payer: COMMERCIAL

## 2018-10-19 VITALS
BODY MASS INDEX: 38.71 KG/M2 | DIASTOLIC BLOOD PRESSURE: 74 MMHG | WEIGHT: 211.63 LBS | SYSTOLIC BLOOD PRESSURE: 122 MMHG

## 2018-10-19 DIAGNOSIS — Z00.00 PREVENTATIVE HEALTH CARE: ICD-10-CM

## 2018-10-19 DIAGNOSIS — Z01.419 NORMAL GYNECOLOGIC EXAMINATION: Primary | ICD-10-CM

## 2018-10-19 PROCEDURE — 99999 PR PBB SHADOW E&M-EST. PATIENT-LVL III: CPT | Mod: PBBFAC,,, | Performed by: NURSE PRACTITIONER

## 2018-10-19 PROCEDURE — 99396 PREV VISIT EST AGE 40-64: CPT | Mod: S$GLB,,, | Performed by: NURSE PRACTITIONER

## 2018-10-19 PROCEDURE — 3008F BODY MASS INDEX DOCD: CPT | Mod: CPTII,S$GLB,, | Performed by: NURSE PRACTITIONER
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
## 2018-12-10 RX ORDER — NORGESTIMATE AND ETHINYL ESTRADIOL 7DAYSX3 28
KIT ORAL
Qty: 28 TABLET | Refills: 6 | Status: SHIPPED | OUTPATIENT
Start: 2018-12-10 | End: 2019-06-21 | Stop reason: SDUPTHER

## 2019-04-12 ENCOUNTER — OFFICE VISIT (OUTPATIENT)
Dept: OPHTHALMOLOGY | Facility: CLINIC | Age: 49
End: 2019-04-12
Payer: COMMERCIAL

## 2019-04-12 DIAGNOSIS — H40.013 AT LOW RISK FOR OPEN-ANGLE GLAUCOMA IN BOTH EYES: Primary | ICD-10-CM

## 2019-04-12 PROCEDURE — 99999 PR PBB SHADOW E&M-EST. PATIENT-LVL I: CPT | Mod: PBBFAC,,, | Performed by: OPTOMETRIST

## 2019-04-12 PROCEDURE — 92012 PR EYE EXAM, EST PATIENT,INTERMED: ICD-10-PCS | Mod: S$GLB,,, | Performed by: OPTOMETRIST

## 2019-04-12 PROCEDURE — 92012 INTRM OPH EXAM EST PATIENT: CPT | Mod: S$GLB,,, | Performed by: OPTOMETRIST

## 2019-04-12 PROCEDURE — 99999 PR PBB SHADOW E&M-EST. PATIENT-LVL I: ICD-10-PCS | Mod: PBBFAC,,, | Performed by: OPTOMETRIST

## 2019-04-12 NOTE — PROGRESS NOTES
HPI     6 months IOP check.  Eyes feels tired and puffy during the day.  Decrease near and distance visual acuity x couple months.  Patient states eyeball are sore x couple of months.  Medication eye drops if any: none  Date last eye visit: 10/18/2018  Last full exam: 10/18/2018  Last IOP : 22/21  Last dilated eye exam and SDP's: 10/18/2018  Last HVF and HRT :   High IOP: 22/21  CCT: 551/550  Family Hx POAG: none    Last edited by Corby Valentine, OD on 4/12/2019  3:09 PM. (History)            Assessment /Plan     For exam results, see Encounter Report.    At low risk for open-angle glaucoma in both eyes      Stable IOP  Unknown family history  Average cornea thickness    Continue artificial tears prn    No phorias present    RTC 1 year full exam

## 2019-06-23 RX ORDER — NORGESTIMATE AND ETHINYL ESTRADIOL 7DAYSX3 28
KIT ORAL
Qty: 28 TABLET | Refills: 6 | Status: SHIPPED | OUTPATIENT
Start: 2019-06-23 | End: 2019-08-21 | Stop reason: ALTCHOICE

## 2019-08-21 ENCOUNTER — PATIENT MESSAGE (OUTPATIENT)
Dept: OBSTETRICS AND GYNECOLOGY | Facility: CLINIC | Age: 49
End: 2019-08-21

## 2019-08-21 ENCOUNTER — OFFICE VISIT (OUTPATIENT)
Dept: OBSTETRICS AND GYNECOLOGY | Facility: CLINIC | Age: 49
End: 2019-08-21
Payer: COMMERCIAL

## 2019-08-21 VITALS
HEIGHT: 62 IN | SYSTOLIC BLOOD PRESSURE: 162 MMHG | BODY MASS INDEX: 40.33 KG/M2 | WEIGHT: 219.13 LBS | DIASTOLIC BLOOD PRESSURE: 90 MMHG

## 2019-08-21 DIAGNOSIS — R10.2 VAGINAL PAIN: Primary | ICD-10-CM

## 2019-08-21 DIAGNOSIS — N89.8 VAGINAL DISCHARGE: ICD-10-CM

## 2019-08-21 LAB
BACTERIA #/AREA URNS AUTO: NORMAL /HPF
BILIRUB UR QL STRIP: NEGATIVE
CLARITY UR REFRACT.AUTO: CLEAR
COLOR UR AUTO: ABNORMAL
GLUCOSE UR QL STRIP: NEGATIVE
HGB UR QL STRIP: NEGATIVE
KETONES UR QL STRIP: NEGATIVE
LEUKOCYTE ESTERASE UR QL STRIP: NEGATIVE
MICROSCOPIC COMMENT: NORMAL
NITRITE UR QL STRIP: POSITIVE
PH UR STRIP: 6 [PH] (ref 5–8)
PROT UR QL STRIP: NEGATIVE
RBC #/AREA URNS AUTO: 3 /HPF (ref 0–4)
SP GR UR STRIP: 1.02 (ref 1–1.03)
SQUAMOUS #/AREA URNS AUTO: 1 /HPF
URN SPEC COLLECT METH UR: ABNORMAL
WBC #/AREA URNS AUTO: 0 /HPF (ref 0–5)

## 2019-08-21 PROCEDURE — 87481 CANDIDA DNA AMP PROBE: CPT | Mod: 59

## 2019-08-21 PROCEDURE — 99213 OFFICE O/P EST LOW 20 MIN: CPT | Mod: S$GLB,,, | Performed by: ADVANCED PRACTICE MIDWIFE

## 2019-08-21 PROCEDURE — 99999 PR PBB SHADOW E&M-EST. PATIENT-LVL III: CPT | Mod: PBBFAC,,, | Performed by: ADVANCED PRACTICE MIDWIFE

## 2019-08-21 PROCEDURE — 99213 PR OFFICE/OUTPT VISIT, EST, LEVL III, 20-29 MIN: ICD-10-PCS | Mod: S$GLB,,, | Performed by: ADVANCED PRACTICE MIDWIFE

## 2019-08-21 PROCEDURE — 87661 TRICHOMONAS VAGINALIS AMPLIF: CPT

## 2019-08-21 PROCEDURE — 3008F PR BODY MASS INDEX (BMI) DOCUMENTED: ICD-10-PCS | Mod: CPTII,S$GLB,, | Performed by: ADVANCED PRACTICE MIDWIFE

## 2019-08-21 PROCEDURE — 99999 PR PBB SHADOW E&M-EST. PATIENT-LVL III: ICD-10-PCS | Mod: PBBFAC,,, | Performed by: ADVANCED PRACTICE MIDWIFE

## 2019-08-21 PROCEDURE — 3008F BODY MASS INDEX DOCD: CPT | Mod: CPTII,S$GLB,, | Performed by: ADVANCED PRACTICE MIDWIFE

## 2019-08-21 PROCEDURE — 87086 URINE CULTURE/COLONY COUNT: CPT

## 2019-08-21 PROCEDURE — 87801 DETECT AGNT MULT DNA AMPLI: CPT

## 2019-08-21 PROCEDURE — 81001 URINALYSIS AUTO W/SCOPE: CPT

## 2019-08-21 RX ORDER — NORGESTIMATE AND ETHINYL ESTRADIOL 7DAYSX3 LO
1 KIT ORAL DAILY
Qty: 28 TABLET | Refills: 12 | Status: SHIPPED | OUTPATIENT
Start: 2019-08-21 | End: 2019-08-21 | Stop reason: ALTCHOICE

## 2019-08-21 RX ORDER — NORETHINDRONE ACETATE AND ETHINYL ESTRADIOL 1MG-20(21)
1 KIT ORAL DAILY
Qty: 28 TABLET | Refills: 12 | Status: SHIPPED | OUTPATIENT
Start: 2019-08-21 | End: 2019-08-28

## 2019-08-21 RX ORDER — DOXYCYCLINE HYCLATE 100 MG
100 TABLET ORAL 2 TIMES DAILY
Qty: 20 TABLET | Refills: 0 | Status: SHIPPED | OUTPATIENT
Start: 2019-08-21 | End: 2019-08-22

## 2019-08-21 NOTE — PROGRESS NOTES
Subjective:       Patient ID: Luana Lou is a 49 y.o. female.    Chief Complaint:  Vaginitis    Patient's last menstrual period was 2019.  History of Present Illness  vaginal pain and discharge for 2 days since ending LMP.     OB History    Para Term  AB Living   0 0 0 0 0 0   SAB TAB Ectopic Multiple Live Births   0 0 0 0 0       Review of Systems  Review of Systems   Constitutional: Negative.    Respiratory: Negative.    Cardiovascular: Negative.    Gastrointestinal: Positive for abdominal pain (sharp constant pain in middle lower abdomen for 2 days rated 5 out of 10.).   Endocrine: Negative.    Genitourinary: Positive for vaginal discharge (abnormal discharge for 2 days ) and vaginal pain (sharp constant pain and pressure in vagina for 2 days ). Negative for difficulty urinating, dysuria, flank pain, frequency, genital sores, hematuria and vaginal bleeding.   Musculoskeletal: Negative.    Skin: Negative.    Neurological: Negative.    Psychiatric/Behavioral: Negative.            Objective:    Physical Exam   Constitutional: She is oriented to person, place, and time. She appears well-developed and well-nourished.   Cardiovascular: Normal rate.   Pulmonary/Chest: Effort normal.   Abdominal: Soft. She exhibits no mass. There is no tenderness. There is no rebound and no guarding.   Genitourinary: Vaginal discharge (thick yellowish discharge) found.   Genitourinary Comments: Cervical motion tenderness   Musculoskeletal: Normal range of motion.   Neurological: She is alert and oriented to person, place, and time.   Skin: Skin is warm and dry.   Psychiatric: She has a normal mood and affect. Her behavior is normal.      Urine dipstick positive nitrites. Sent for culture.      Assessment:     1. Vaginal pain    2. Vaginal discharge              Plan:   Luana was seen today for vaginitis.    Diagnoses and all orders for this visit:    Vaginal pain    Vaginal discharge  -      Vaginosis Screen by DNA Probe  -     Urinalysis  -     Urine culture    Other orders  -     doxycycline (VIBRA-TABS) 100 MG tablet; Take 1 tablet (100 mg total) by mouth 2 (two) times daily. for 10 days  -     Discontinue: norgestimate-ethinyl estradiol (ORTHO TRI-CYCLEN LO, 28,) 0.18/0.215/0.25 mg-25 mcg tablet; Take 1 tablet by mouth once daily.  -     norethindrone-ethinyl estradiol (JUNEL FE 1/20) 1 mg-20 mcg (21)/75 mg (7) per tablet; Take 1 tablet by mouth once daily.    Pt will regularly monitor BP at home. Takes Propranolol for headaches managed by PCP who is currently adjusting dose.   Changed OCPs to Junel monophasic.     ED STEVENS/CHACHA Magdaleno CNM

## 2019-08-22 ENCOUNTER — TELEPHONE (OUTPATIENT)
Dept: OBSTETRICS AND GYNECOLOGY | Facility: CLINIC | Age: 49
End: 2019-08-22

## 2019-08-22 LAB — BACTERIA UR CULT: NO GROWTH

## 2019-08-22 RX ORDER — NITROFURANTOIN 25; 75 MG/1; MG/1
100 CAPSULE ORAL 2 TIMES DAILY
Qty: 14 CAPSULE | Refills: 0 | Status: SHIPPED | OUTPATIENT
Start: 2019-08-22 | End: 2019-08-26

## 2019-08-22 NOTE — TELEPHONE ENCOUNTER
----- Message from Giovani Almeida sent at 8/22/2019  8:50 AM CDT -----  Contact: [facility]  Type:  Pharmacy Calling to Clarify an RX    Name of Caller:[facility]reen  Pharmacy [facility]  Prescription Name:doxycycline  What do they need to clarify?:pt states she gets a rash from current medication so pharmacy is requesting changing pt prescription.  Best Call Back Number:[phone]  Additional Information: none    Thanks,  Giovani Almeida

## 2019-08-22 NOTE — TELEPHONE ENCOUNTER
Spoke to ambreen at pharmacy. Informed that a message has been sent to Karyn Powell NP to send in a different abx.

## 2019-08-23 LAB
BACTERIAL VAGINOSIS DNA: NEGATIVE
CANDIDA GLABRATA DNA: POSITIVE
CANDIDA KRUSEI DNA: NEGATIVE
CANDIDA RRNA VAG QL PROBE: NEGATIVE
T VAGINALIS RRNA GENITAL QL PROBE: NEGATIVE

## 2019-08-25 ENCOUNTER — PATIENT MESSAGE (OUTPATIENT)
Dept: OBSTETRICS AND GYNECOLOGY | Facility: CLINIC | Age: 49
End: 2019-08-25

## 2019-08-26 RX ORDER — FLUCONAZOLE 150 MG/1
150 TABLET ORAL DAILY
Qty: 2 TABLET | Refills: 0 | Status: SHIPPED | OUTPATIENT
Start: 2019-08-26 | End: 2019-08-28

## 2019-08-27 ENCOUNTER — PATIENT MESSAGE (OUTPATIENT)
Dept: OBSTETRICS AND GYNECOLOGY | Facility: HOSPITAL | Age: 49
End: 2019-08-27

## 2019-08-28 ENCOUNTER — OFFICE VISIT (OUTPATIENT)
Dept: OBSTETRICS AND GYNECOLOGY | Facility: CLINIC | Age: 49
End: 2019-08-28
Payer: COMMERCIAL

## 2019-08-28 ENCOUNTER — NURSE TRIAGE (OUTPATIENT)
Dept: ADMINISTRATIVE | Facility: CLINIC | Age: 49
End: 2019-08-28

## 2019-08-28 VITALS
BODY MASS INDEX: 40.08 KG/M2 | SYSTOLIC BLOOD PRESSURE: 122 MMHG | HEIGHT: 62 IN | DIASTOLIC BLOOD PRESSURE: 82 MMHG | WEIGHT: 217.81 LBS

## 2019-08-28 DIAGNOSIS — Z30.09 CONTRACEPTIVE EDUCATION: Primary | ICD-10-CM

## 2019-08-28 PROBLEM — K80.20 GALLSTONES: Status: RESOLVED | Noted: 2017-02-13 | Resolved: 2019-08-28

## 2019-08-28 PROBLEM — R42 DIZZINESS: Status: RESOLVED | Noted: 2017-03-10 | Resolved: 2019-08-28

## 2019-08-28 PROCEDURE — 99213 OFFICE O/P EST LOW 20 MIN: CPT | Mod: S$GLB,,, | Performed by: NURSE PRACTITIONER

## 2019-08-28 PROCEDURE — 99999 PR PBB SHADOW E&M-EST. PATIENT-LVL III: ICD-10-PCS | Mod: PBBFAC,,, | Performed by: NURSE PRACTITIONER

## 2019-08-28 PROCEDURE — 3008F BODY MASS INDEX DOCD: CPT | Mod: CPTII,S$GLB,, | Performed by: NURSE PRACTITIONER

## 2019-08-28 PROCEDURE — 99999 PR PBB SHADOW E&M-EST. PATIENT-LVL III: CPT | Mod: PBBFAC,,, | Performed by: NURSE PRACTITIONER

## 2019-08-28 PROCEDURE — 3008F PR BODY MASS INDEX (BMI) DOCUMENTED: ICD-10-PCS | Mod: CPTII,S$GLB,, | Performed by: NURSE PRACTITIONER

## 2019-08-28 PROCEDURE — 99213 PR OFFICE/OUTPT VISIT, EST, LEVL III, 20-29 MIN: ICD-10-PCS | Mod: S$GLB,,, | Performed by: NURSE PRACTITIONER
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
## 2019-08-28 NOTE — TELEPHONE ENCOUNTER
Reason for Disposition   Swelling began after taking a drug    Additional Information   Negative: [1] Life-threatening reaction (anaphylaxis) in the past to similar substance (e.g., food, insect bite/sting, chemical, etc.) AND [2] < 2 hours since exposure   Negative: Unresponsive, passed out or very weak   Negative: Swollen tongue   Negative: Difficulty breathing or wheezing   Negative: Sounds like a life-threatening emergency to the triager   Negative: [1] SEVERE swelling of entire face AND [2] < 2 hours since exposure to high-risk allergen (e.g., peanuts, tree nuts, fish, shellfish or 1st dose of drug) AND [3] no serious symptoms AND [4] no serious allergic reaction in the past   Negative: Fever   Negative: Taking an ACE Inhibitor medication  (e.g., benazepril/LOTENSIN, captopril/CAPOTEN, enalapril/VASOTEC, lisinopril/ZESTRIL)   Negative: Patient sounds very sick or weak to the triager   Negative: Pregnant > 20 weeks   Negative: Postpartum (i.e. < 1 month since delivery)   Negative: SEVERE swelling of the entire face   Negative: [1] Swelling is red AND [2] very painful to touch    Protocols used: FACE SWELLING-A-AH    Pt called stated her birth control pills were changed a few days ago and she has been swelling. She said it started with her hands and arms about 3 days ago. And now its her face and body. Pt stated she feels puffy and achy all over. Pt educated per care advice appointment scheduled for this am.

## 2019-08-29 ENCOUNTER — PATIENT MESSAGE (OUTPATIENT)
Dept: OBSTETRICS AND GYNECOLOGY | Facility: CLINIC | Age: 49
End: 2019-08-29

## 2019-09-06 ENCOUNTER — PATIENT MESSAGE (OUTPATIENT)
Dept: OBSTETRICS AND GYNECOLOGY | Facility: CLINIC | Age: 49
End: 2019-09-06

## 2019-09-06 RX ORDER — FLUCONAZOLE 150 MG/1
150 TABLET ORAL DAILY
Qty: 2 TABLET | Refills: 0 | Status: SHIPPED | OUTPATIENT
Start: 2019-09-06 | End: 2019-09-08

## 2019-10-25 ENCOUNTER — OFFICE VISIT (OUTPATIENT)
Dept: OBSTETRICS AND GYNECOLOGY | Facility: CLINIC | Age: 49
End: 2019-10-25
Payer: COMMERCIAL

## 2019-10-25 ENCOUNTER — PATIENT MESSAGE (OUTPATIENT)
Dept: OBSTETRICS AND GYNECOLOGY | Facility: CLINIC | Age: 49
End: 2019-10-25

## 2019-10-25 VITALS
WEIGHT: 218.69 LBS | BODY MASS INDEX: 40.25 KG/M2 | HEIGHT: 62 IN | DIASTOLIC BLOOD PRESSURE: 72 MMHG | SYSTOLIC BLOOD PRESSURE: 108 MMHG

## 2019-10-25 DIAGNOSIS — Z01.419 PAP SMEAR, AS PART OF ROUTINE GYNECOLOGICAL EXAMINATION: Primary | ICD-10-CM

## 2019-10-25 DIAGNOSIS — Z12.39 BREAST CANCER SCREENING: ICD-10-CM

## 2019-10-25 DIAGNOSIS — Z00.00 PREVENTATIVE HEALTH CARE: ICD-10-CM

## 2019-10-25 PROCEDURE — 99999 PR PBB SHADOW E&M-EST. PATIENT-LVL III: CPT | Mod: PBBFAC,,, | Performed by: NURSE PRACTITIONER

## 2019-10-25 PROCEDURE — 99396 PR PREVENTIVE VISIT,EST,40-64: ICD-10-PCS | Mod: S$GLB,,, | Performed by: NURSE PRACTITIONER

## 2019-10-25 PROCEDURE — 87624 HPV HI-RISK TYP POOLED RSLT: CPT

## 2019-10-25 PROCEDURE — 99999 PR PBB SHADOW E&M-EST. PATIENT-LVL III: ICD-10-PCS | Mod: PBBFAC,,, | Performed by: NURSE PRACTITIONER

## 2019-10-25 PROCEDURE — 88175 CYTOPATH C/V AUTO FLUID REDO: CPT

## 2019-10-25 PROCEDURE — 99396 PREV VISIT EST AGE 40-64: CPT | Mod: S$GLB,,, | Performed by: NURSE PRACTITIONER

## 2019-10-25 RX ORDER — AZELASTINE 1 MG/ML
SPRAY, METERED NASAL
Refills: 0 | COMMUNITY
Start: 2019-10-03 | End: 2020-03-02

## 2019-10-25 RX ORDER — NORGESTIMATE AND ETHINYL ESTRADIOL 7DAYSX3 28
1 KIT ORAL DAILY
Refills: 6 | COMMUNITY
Start: 2019-09-11 | End: 2019-12-30
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
## 2019-10-26 ENCOUNTER — NURSE TRIAGE (OUTPATIENT)
Dept: ADMINISTRATIVE | Facility: CLINIC | Age: 49
End: 2019-10-26

## 2019-10-26 ENCOUNTER — TELEPHONE (OUTPATIENT)
Dept: OBSTETRICS AND GYNECOLOGY | Facility: HOSPITAL | Age: 49
End: 2019-10-26

## 2019-10-26 NOTE — TELEPHONE ENCOUNTER
Pt called to discuss her visit from Friday and wondering what medication was supposed to be called in - thinks it was diflucan. She denies vaginal itch or dc. She described pain with the pelvic exam and moderate vaginal bleeding since the visit. She normally has monthly menses and today is day 14.  has history vasectomy.   Pt denies  N/v/fever/ can jona po. Pain is tolerable, pt is functional but not sure what the cause is.     F/u visit recommended if her complaints still continue by Monday. Nurses: please call w earliest available chay Boss.   Karyn, please call me for details.     In meantime if pain escalates to severe, or fever develops, to call back or consider ER visit.

## 2019-10-26 NOTE — TELEPHONE ENCOUNTER
Had a problem with pain in cervix in uterus about a month ago. Did not work and was seen again on Friday for pain and bleeding. Was told a Rx would be called in but not at pharmacy.     Reason for Disposition   [1] Prescription not at pharmacy AND [2] was prescribed today by PCP    Protocols used: MEDICATION QUESTION CALL-A-AH

## 2019-10-27 RX ORDER — METRONIDAZOLE 7.5 MG/G
1 GEL VAGINAL NIGHTLY
Qty: 5 APPLICATOR | Refills: 0 | Status: SHIPPED | OUTPATIENT
Start: 2019-10-27 | End: 2019-11-01

## 2019-10-30 LAB
HPV HR 12 DNA CVX QL NAA+PROBE: NEGATIVE
HPV16 AG SPEC QL: NEGATIVE
HPV18 DNA SPEC QL NAA+PROBE: NEGATIVE

## 2019-11-19 ENCOUNTER — OFFICE VISIT (OUTPATIENT)
Dept: INTERNAL MEDICINE | Facility: CLINIC | Age: 49
End: 2019-11-19
Payer: COMMERCIAL

## 2019-11-19 ENCOUNTER — LAB VISIT (OUTPATIENT)
Dept: LAB | Facility: HOSPITAL | Age: 49
End: 2019-11-19
Attending: FAMILY MEDICINE
Payer: COMMERCIAL

## 2019-11-19 VITALS
BODY MASS INDEX: 38.83 KG/M2 | TEMPERATURE: 96 F | OXYGEN SATURATION: 97 % | RESPIRATION RATE: 18 BRPM | HEART RATE: 69 BPM | DIASTOLIC BLOOD PRESSURE: 82 MMHG | SYSTOLIC BLOOD PRESSURE: 122 MMHG | WEIGHT: 212.31 LBS

## 2019-11-19 DIAGNOSIS — Z76.89 ENCOUNTER TO ESTABLISH CARE: Primary | ICD-10-CM

## 2019-11-19 DIAGNOSIS — Z00.00 ANNUAL PHYSICAL EXAM: ICD-10-CM

## 2019-11-19 DIAGNOSIS — E03.9 HYPOTHYROIDISM, UNSPECIFIED TYPE: ICD-10-CM

## 2019-11-19 DIAGNOSIS — J30.2 SEASONAL ALLERGIC RHINITIS, UNSPECIFIED TRIGGER: ICD-10-CM

## 2019-11-19 DIAGNOSIS — N39.0 RECURRENT UTI: ICD-10-CM

## 2019-11-19 DIAGNOSIS — G43.909 MIGRAINE WITHOUT STATUS MIGRAINOSUS, NOT INTRACTABLE, UNSPECIFIED MIGRAINE TYPE: ICD-10-CM

## 2019-11-19 LAB
ALBUMIN SERPL BCP-MCNC: 3.5 G/DL (ref 3.5–5.2)
ALP SERPL-CCNC: 56 U/L (ref 55–135)
ALT SERPL W/O P-5'-P-CCNC: 30 U/L (ref 10–44)
ANION GAP SERPL CALC-SCNC: 8 MMOL/L (ref 8–16)
AST SERPL-CCNC: 20 U/L (ref 10–40)
BASOPHILS # BLD AUTO: 0.09 K/UL (ref 0–0.2)
BASOPHILS NFR BLD: 0.9 % (ref 0–1.9)
BILIRUB SERPL-MCNC: 0.5 MG/DL (ref 0.1–1)
BUN SERPL-MCNC: 11 MG/DL (ref 6–20)
CALCIUM SERPL-MCNC: 8.9 MG/DL (ref 8.7–10.5)
CHLORIDE SERPL-SCNC: 104 MMOL/L (ref 95–110)
CHOLEST SERPL-MCNC: 165 MG/DL (ref 120–199)
CHOLEST/HDLC SERPL: 3.2 {RATIO} (ref 2–5)
CO2 SERPL-SCNC: 29 MMOL/L (ref 23–29)
CREAT SERPL-MCNC: 1.1 MG/DL (ref 0.5–1.4)
DIFFERENTIAL METHOD: ABNORMAL
EOSINOPHIL # BLD AUTO: 0.3 K/UL (ref 0–0.5)
EOSINOPHIL NFR BLD: 2.7 % (ref 0–8)
ERYTHROCYTE [DISTWIDTH] IN BLOOD BY AUTOMATED COUNT: 13.1 % (ref 11.5–14.5)
EST. GFR  (AFRICAN AMERICAN): >60 ML/MIN/1.73 M^2
EST. GFR  (NON AFRICAN AMERICAN): 59.5 ML/MIN/1.73 M^2
GLUCOSE SERPL-MCNC: 88 MG/DL (ref 70–110)
HCT VFR BLD AUTO: 45.2 % (ref 37–48.5)
HDLC SERPL-MCNC: 51 MG/DL (ref 40–75)
HDLC SERPL: 30.9 % (ref 20–50)
HGB BLD-MCNC: 14.4 G/DL (ref 12–16)
IMM GRANULOCYTES # BLD AUTO: 0.31 K/UL (ref 0–0.04)
IMM GRANULOCYTES NFR BLD AUTO: 3 % (ref 0–0.5)
LDLC SERPL CALC-MCNC: 70.8 MG/DL (ref 63–159)
LYMPHOCYTES # BLD AUTO: 2.9 K/UL (ref 1–4.8)
LYMPHOCYTES NFR BLD: 27.8 % (ref 18–48)
MCH RBC QN AUTO: 28.6 PG (ref 27–31)
MCHC RBC AUTO-ENTMCNC: 31.9 G/DL (ref 32–36)
MCV RBC AUTO: 90 FL (ref 82–98)
MONOCYTES # BLD AUTO: 1 K/UL (ref 0.3–1)
MONOCYTES NFR BLD: 9.5 % (ref 4–15)
NEUTROPHILS # BLD AUTO: 5.8 K/UL (ref 1.8–7.7)
NEUTROPHILS NFR BLD: 56.1 % (ref 38–73)
NONHDLC SERPL-MCNC: 114 MG/DL
NRBC BLD-RTO: 0 /100 WBC
PLATELET # BLD AUTO: 270 K/UL (ref 150–350)
PMV BLD AUTO: 9.7 FL (ref 9.2–12.9)
POTASSIUM SERPL-SCNC: 3.8 MMOL/L (ref 3.5–5.1)
PROT SERPL-MCNC: 6.4 G/DL (ref 6–8.4)
RBC # BLD AUTO: 5.03 M/UL (ref 4–5.4)
SODIUM SERPL-SCNC: 141 MMOL/L (ref 136–145)
TRIGL SERPL-MCNC: 216 MG/DL (ref 30–150)
TSH SERPL DL<=0.005 MIU/L-ACNC: 2.75 UIU/ML (ref 0.4–4)
WBC # BLD AUTO: 10.31 K/UL (ref 3.9–12.7)

## 2019-11-19 PROCEDURE — 99999 PR PBB SHADOW E&M-EST. PATIENT-LVL IV: ICD-10-PCS | Mod: PBBFAC,,, | Performed by: FAMILY MEDICINE

## 2019-11-19 PROCEDURE — 36415 COLL VENOUS BLD VENIPUNCTURE: CPT

## 2019-11-19 PROCEDURE — 99386 PR PREVENTIVE VISIT,NEW,40-64: ICD-10-PCS | Mod: S$GLB,,, | Performed by: FAMILY MEDICINE

## 2019-11-19 PROCEDURE — 99386 PREV VISIT NEW AGE 40-64: CPT | Mod: S$GLB,,, | Performed by: FAMILY MEDICINE

## 2019-11-19 PROCEDURE — 85025 COMPLETE CBC W/AUTO DIFF WBC: CPT

## 2019-11-19 PROCEDURE — 80053 COMPREHEN METABOLIC PANEL: CPT

## 2019-11-19 PROCEDURE — 84443 ASSAY THYROID STIM HORMONE: CPT

## 2019-11-19 PROCEDURE — 80061 LIPID PANEL: CPT

## 2019-11-19 PROCEDURE — 99999 PR PBB SHADOW E&M-EST. PATIENT-LVL IV: CPT | Mod: PBBFAC,,, | Performed by: FAMILY MEDICINE

## 2019-11-19 RX ORDER — NITROFURANTOIN MACROCRYSTALS 50 MG/1
50 CAPSULE ORAL 2 TIMES DAILY
Qty: 30 CAPSULE | Refills: 0 | Status: SHIPPED | OUTPATIENT
Start: 2019-11-19 | End: 2020-03-19
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
## 2019-11-22 ENCOUNTER — PATIENT MESSAGE (OUTPATIENT)
Dept: INTERNAL MEDICINE | Facility: CLINIC | Age: 49
End: 2019-11-22

## 2019-11-26 ENCOUNTER — TELEPHONE (OUTPATIENT)
Dept: INTERNAL MEDICINE | Facility: CLINIC | Age: 49
End: 2019-11-26

## 2019-11-26 NOTE — TELEPHONE ENCOUNTER
----- Message from Eliezer Aly MD sent at 11/20/2019 10:33 AM CST -----  Please call the patient regarding her labs  Stable  Did not her GFR mildly declined - indicated some degree of renal impairment - Creatinine was normal - no acute concern but will continue to monitor.  Avoid excessive NSAID use ( ibuprofen, Mobic, naproxen, etc

## 2019-11-26 NOTE — TELEPHONE ENCOUNTER
Called the patient to clarify the instructions to avoid NSIDS and to taken tylenol when needed. /sl/

## 2019-12-02 ENCOUNTER — PATIENT MESSAGE (OUTPATIENT)
Dept: INTERNAL MEDICINE | Facility: CLINIC | Age: 49
End: 2019-12-02

## 2019-12-02 RX ORDER — LEVOTHYROXINE SODIUM 125 UG/1
125 TABLET ORAL DAILY
Qty: 90 TABLET | Refills: 1 | Status: SHIPPED | OUTPATIENT
Start: 2019-12-02 | End: 2019-12-06 | Stop reason: DRUGHIGH

## 2019-12-06 ENCOUNTER — PATIENT MESSAGE (OUTPATIENT)
Dept: INTERNAL MEDICINE | Facility: CLINIC | Age: 49
End: 2019-12-06

## 2019-12-06 RX ORDER — LEVOTHYROXINE SODIUM 150 UG/1
150 TABLET ORAL DAILY
Qty: 90 TABLET | Refills: 1 | Status: SHIPPED | OUTPATIENT
Start: 2019-12-06 | End: 2020-08-31 | Stop reason: SDUPTHER

## 2019-12-09 ENCOUNTER — TELEPHONE (OUTPATIENT)
Dept: INTERNAL MEDICINE | Facility: CLINIC | Age: 49
End: 2019-12-09

## 2019-12-09 NOTE — TELEPHONE ENCOUNTER
----- Message from Sirena Perez sent at 12/9/2019  4:10 PM CST -----  Contact: Elizabeth/[facility] [phone]  Type:  Pharmacy Calling to Clarify an RX    Name of Caller:Elizabeth  Pharmacy Name:[facility]  Prescription Name:synthroid 150 mcg  What do they need to clarify?:   Best Call Back Number:[phone]  Additional Information: States that pt stated that the rx was to be increased to a higher dosage.

## 2019-12-24 ENCOUNTER — HOSPITAL ENCOUNTER (OUTPATIENT)
Dept: RADIOLOGY | Facility: HOSPITAL | Age: 49
Discharge: HOME | End: 2019-12-24
Attending: NURSE PRACTITIONER
Payer: COMMERCIAL

## 2019-12-24 DIAGNOSIS — Z12.39 BREAST CANCER SCREENING: ICD-10-CM

## 2019-12-24 DIAGNOSIS — Z00.00 PREVENTATIVE HEALTH CARE: ICD-10-CM

## 2019-12-24 PROCEDURE — 77067 MAMMO DIGITAL SCREENING BILAT WITH TOMOSYNTHESIS_CAD: ICD-10-PCS | Mod: 26,,, | Performed by: RADIOLOGY

## 2019-12-24 PROCEDURE — 77067 SCR MAMMO BI INCL CAD: CPT | Mod: 26,,, | Performed by: RADIOLOGY

## 2019-12-24 PROCEDURE — 77067 SCR MAMMO BI INCL CAD: CPT | Mod: TC

## 2019-12-24 PROCEDURE — 77063 BREAST TOMOSYNTHESIS BI: CPT | Mod: 26,,, | Performed by: RADIOLOGY

## 2019-12-24 PROCEDURE — 77063 MAMMO DIGITAL SCREENING BILAT WITH TOMOSYNTHESIS_CAD: ICD-10-PCS | Mod: 26,,, | Performed by: RADIOLOGY

## 2019-12-30 RX ORDER — NORGESTIMATE AND ETHINYL ESTRADIOL 7DAYSX3 28
1 KIT ORAL DAILY
Qty: 84 TABLET | Refills: 4 | Status: SHIPPED | OUTPATIENT
Start: 2019-12-30 | End: 2020-03-02

## 2020-03-02 ENCOUNTER — TELEPHONE (OUTPATIENT)
Dept: INTERNAL MEDICINE | Facility: CLINIC | Age: 50
End: 2020-03-02

## 2020-03-02 ENCOUNTER — OFFICE VISIT (OUTPATIENT)
Dept: INTERNAL MEDICINE | Facility: CLINIC | Age: 50
End: 2020-03-02
Payer: COMMERCIAL

## 2020-03-02 VITALS
SYSTOLIC BLOOD PRESSURE: 168 MMHG | HEART RATE: 78 BPM | BODY MASS INDEX: 39.19 KG/M2 | TEMPERATURE: 96 F | RESPIRATION RATE: 18 BRPM | OXYGEN SATURATION: 97 % | WEIGHT: 214.31 LBS | DIASTOLIC BLOOD PRESSURE: 110 MMHG

## 2020-03-02 DIAGNOSIS — J06.9 URTI (ACUTE UPPER RESPIRATORY INFECTION): Primary | ICD-10-CM

## 2020-03-02 DIAGNOSIS — R03.0 ELEVATED BLOOD PRESSURE READING: ICD-10-CM

## 2020-03-02 LAB
INFLUENZA A, MOLECULAR: NEGATIVE
INFLUENZA B, MOLECULAR: NEGATIVE
SPECIMEN SOURCE: NORMAL

## 2020-03-02 PROCEDURE — 3008F BODY MASS INDEX DOCD: CPT | Mod: CPTII,S$GLB,, | Performed by: FAMILY MEDICINE

## 2020-03-02 PROCEDURE — 99213 OFFICE O/P EST LOW 20 MIN: CPT | Mod: S$GLB,,, | Performed by: FAMILY MEDICINE

## 2020-03-02 PROCEDURE — 3008F PR BODY MASS INDEX (BMI) DOCUMENTED: ICD-10-PCS | Mod: CPTII,S$GLB,, | Performed by: FAMILY MEDICINE

## 2020-03-02 PROCEDURE — 99999 PR PBB SHADOW E&M-EST. PATIENT-LVL III: CPT | Mod: PBBFAC,,, | Performed by: FAMILY MEDICINE

## 2020-03-02 PROCEDURE — 99999 PR PBB SHADOW E&M-EST. PATIENT-LVL III: ICD-10-PCS | Mod: PBBFAC,,, | Performed by: FAMILY MEDICINE

## 2020-03-02 PROCEDURE — 99213 PR OFFICE/OUTPT VISIT, EST, LEVL III, 20-29 MIN: ICD-10-PCS | Mod: S$GLB,,, | Performed by: FAMILY MEDICINE

## 2020-03-02 PROCEDURE — 87502 INFLUENZA DNA AMP PROBE: CPT

## 2020-03-02 RX ORDER — HYDROCHLOROTHIAZIDE 12.5 MG/1
12.5 CAPSULE ORAL DAILY
Qty: 30 CAPSULE | Refills: 0 | Status: SHIPPED | OUTPATIENT
Start: 2020-03-02 | End: 2020-08-28

## 2020-03-02 RX ORDER — PREDNISONE 20 MG/1
20 TABLET ORAL DAILY
Qty: 5 TABLET | Refills: 0 | Status: SHIPPED | OUTPATIENT
Start: 2020-03-02 | End: 2020-03-07

## 2020-03-02 NOTE — TELEPHONE ENCOUNTER
----- Message from Eliezer Aly MD sent at 3/2/2020  8:21 AM CST -----  Please call the patient regarding her swab  Flu negative
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
## 2020-03-04 ENCOUNTER — PATIENT MESSAGE (OUTPATIENT)
Dept: INTERNAL MEDICINE | Facility: CLINIC | Age: 50
End: 2020-03-04

## 2020-03-11 ENCOUNTER — NURSE TRIAGE (OUTPATIENT)
Dept: ADMINISTRATIVE | Facility: CLINIC | Age: 50
End: 2020-03-11

## 2020-03-11 ENCOUNTER — HOSPITAL ENCOUNTER (EMERGENCY)
Facility: HOSPITAL | Age: 50
Discharge: HOME | End: 2020-03-11
Attending: EMERGENCY MEDICINE
Payer: COMMERCIAL

## 2020-03-11 ENCOUNTER — PATIENT MESSAGE (OUTPATIENT)
Dept: INTERNAL MEDICINE | Facility: CLINIC | Age: 50
End: 2020-03-11

## 2020-03-11 VITALS
DIASTOLIC BLOOD PRESSURE: 89 MMHG | BODY MASS INDEX: 38.95 KG/M2 | SYSTOLIC BLOOD PRESSURE: 143 MMHG | OXYGEN SATURATION: 100 % | TEMPERATURE: 98 F | RESPIRATION RATE: 18 BRPM | WEIGHT: 212.94 LBS | HEART RATE: 80 BPM

## 2020-03-11 DIAGNOSIS — R07.89 CHEST TIGHTNESS: ICD-10-CM

## 2020-03-11 DIAGNOSIS — J04.0 LARYNGITIS: ICD-10-CM

## 2020-03-11 DIAGNOSIS — R07.9 CHEST PAIN: ICD-10-CM

## 2020-03-11 DIAGNOSIS — J06.9 VIRAL URI WITH COUGH: Primary | ICD-10-CM

## 2020-03-11 DIAGNOSIS — I10 CHRONIC HYPERTENSION: ICD-10-CM

## 2020-03-11 LAB
ALBUMIN SERPL BCP-MCNC: 4.2 G/DL (ref 3.5–5.2)
ALP SERPL-CCNC: 70 U/L (ref 55–135)
ALT SERPL W/O P-5'-P-CCNC: 104 U/L (ref 10–44)
ANION GAP SERPL CALC-SCNC: 12 MMOL/L (ref 8–16)
AST SERPL-CCNC: 66 U/L (ref 10–40)
BASOPHILS # BLD AUTO: 0.08 K/UL (ref 0–0.2)
BASOPHILS NFR BLD: 0.9 % (ref 0–1.9)
BILIRUB SERPL-MCNC: 0.6 MG/DL (ref 0.1–1)
BNP SERPL-MCNC: <10 PG/ML (ref 0–99)
BUN SERPL-MCNC: 12 MG/DL (ref 6–20)
CALCIUM SERPL-MCNC: 10.1 MG/DL (ref 8.7–10.5)
CHLORIDE SERPL-SCNC: 106 MMOL/L (ref 95–110)
CO2 SERPL-SCNC: 24 MMOL/L (ref 23–29)
CREAT SERPL-MCNC: 1 MG/DL (ref 0.5–1.4)
DIFFERENTIAL METHOD: NORMAL
EOSINOPHIL # BLD AUTO: 0.4 K/UL (ref 0–0.5)
EOSINOPHIL NFR BLD: 3.9 % (ref 0–8)
ERYTHROCYTE [DISTWIDTH] IN BLOOD BY AUTOMATED COUNT: 12 % (ref 11.5–14.5)
EST. GFR  (AFRICAN AMERICAN): >60 ML/MIN/1.73 M^2
EST. GFR  (NON AFRICAN AMERICAN): >60 ML/MIN/1.73 M^2
GLUCOSE SERPL-MCNC: 85 MG/DL (ref 70–110)
HCT VFR BLD AUTO: 43.8 % (ref 37–48.5)
HGB BLD-MCNC: 14.6 G/DL (ref 12–16)
IMM GRANULOCYTES # BLD AUTO: 0.04 K/UL (ref 0–0.04)
IMM GRANULOCYTES NFR BLD AUTO: 0.4 % (ref 0–0.5)
INFLUENZA A, MOLECULAR: NEGATIVE
INFLUENZA B, MOLECULAR: NEGATIVE
LYMPHOCYTES # BLD AUTO: 3 K/UL (ref 1–4.8)
LYMPHOCYTES NFR BLD: 33 % (ref 18–48)
MCH RBC QN AUTO: 28.3 PG (ref 27–31)
MCHC RBC AUTO-ENTMCNC: 33.3 G/DL (ref 32–36)
MCV RBC AUTO: 85 FL (ref 82–98)
MONOCYTES # BLD AUTO: 0.8 K/UL (ref 0.3–1)
MONOCYTES NFR BLD: 8.5 % (ref 4–15)
NEUTROPHILS # BLD AUTO: 4.8 K/UL (ref 1.8–7.7)
NEUTROPHILS NFR BLD: 53.3 % (ref 38–73)
NRBC BLD-RTO: 0 /100 WBC
PLATELET # BLD AUTO: 290 K/UL (ref 150–350)
PMV BLD AUTO: 9.4 FL (ref 9.2–12.9)
POTASSIUM SERPL-SCNC: 3.8 MMOL/L (ref 3.5–5.1)
PROT SERPL-MCNC: 7.2 G/DL (ref 6–8.4)
RBC # BLD AUTO: 5.16 M/UL (ref 4–5.4)
SODIUM SERPL-SCNC: 142 MMOL/L (ref 136–145)
SPECIMEN SOURCE: NORMAL
TROPONIN I SERPL DL<=0.01 NG/ML-MCNC: 0.01 NG/ML (ref 0–0.03)
WBC # BLD AUTO: 9.01 K/UL (ref 3.9–12.7)

## 2020-03-11 PROCEDURE — 80053 COMPREHEN METABOLIC PANEL: CPT

## 2020-03-11 PROCEDURE — 87502 INFLUENZA DNA AMP PROBE: CPT

## 2020-03-11 PROCEDURE — 83880 ASSAY OF NATRIURETIC PEPTIDE: CPT

## 2020-03-11 PROCEDURE — 93010 ELECTROCARDIOGRAM REPORT: CPT | Mod: ,,, | Performed by: INTERNAL MEDICINE

## 2020-03-11 PROCEDURE — 93010 EKG 12-LEAD: ICD-10-PCS | Mod: ,,, | Performed by: INTERNAL MEDICINE

## 2020-03-11 PROCEDURE — 85025 COMPLETE CBC W/AUTO DIFF WBC: CPT

## 2020-03-11 PROCEDURE — 96372 THER/PROPH/DIAG INJ SC/IM: CPT

## 2020-03-11 PROCEDURE — 93005 ELECTROCARDIOGRAM TRACING: CPT

## 2020-03-11 PROCEDURE — 36415 COLL VENOUS BLD VENIPUNCTURE: CPT

## 2020-03-11 PROCEDURE — 63600175 PHARM REV CODE 636 W HCPCS: Performed by: EMERGENCY MEDICINE

## 2020-03-11 PROCEDURE — 84484 ASSAY OF TROPONIN QUANT: CPT

## 2020-03-11 PROCEDURE — 99285 EMERGENCY DEPT VISIT HI MDM: CPT | Mod: 25

## 2020-03-11 RX ORDER — BETAMETHASONE SODIUM PHOSPHATE AND BETAMETHASONE ACETATE 3; 3 MG/ML; MG/ML
6 INJECTION, SUSPENSION INTRA-ARTICULAR; INTRALESIONAL; INTRAMUSCULAR; SOFT TISSUE
Status: COMPLETED | OUTPATIENT
Start: 2020-03-11 | End: 2020-03-11

## 2020-03-11 RX ADMIN — BETAMETHASONE SODIUM PHOSPHATE AND BETAMETHASONE ACETATE 6 MG: 3; 3 INJECTION, SUSPENSION INTRA-ARTICULAR; INTRALESIONAL; INTRAMUSCULAR at 07:03

## 2020-03-11 NOTE — TELEPHONE ENCOUNTER
Pt states she had cold approx 1 week ago and saw PCP. Pt was prescribed steroid. Pt states nasal congestion improved, but pt continues sore throat, cough, and hoarseness. Pt unsure of temp. Pt c/o CP and SOB. Pt advised per protocol to ED now and pt verbalizes understanding. Pt denies travel outside of [facility] this year and pt denies known coronavirus exposure. Pt states her  recently traveled to [place] and was recently sick. ED notified of pt coming to ED.    Reason for Disposition   Chest pain present when not coughing    Additional Information   Negative: Bluish (or gray) lips or face   Negative: Severe difficulty breathing (e.g., struggling for each breath, speaks in single words)   Negative: Rapid onset of cough and has hives   Negative: Coughing started suddenly after medicine, an allergic food or bee sting   Negative: Difficulty breathing after exposure to flames, smoke, or fumes   Negative: Sounds like a life-threatening emergency to the triager    Protocols used: COUGH-A-OH
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
## 2020-03-14 ENCOUNTER — PATIENT MESSAGE (OUTPATIENT)
Dept: INTERNAL MEDICINE | Facility: CLINIC | Age: 50
End: 2020-03-14

## 2020-03-19 RX ORDER — NITROFURANTOIN MACROCRYSTALS 50 MG/1
CAPSULE ORAL
Qty: 30 CAPSULE | Refills: 0 | Status: SHIPPED | OUTPATIENT
Start: 2020-03-19 | End: 2020-07-09

## 2020-05-13 ENCOUNTER — PATIENT MESSAGE (OUTPATIENT)
Dept: INTERNAL MEDICINE | Facility: CLINIC | Age: 50
End: 2020-05-13

## 2020-05-14 ENCOUNTER — TELEPHONE (OUTPATIENT)
Dept: INTERNAL MEDICINE | Facility: CLINIC | Age: 50
End: 2020-05-14

## 2020-07-17 ENCOUNTER — LAB VISIT (OUTPATIENT)
Dept: PRIMARY CARE CLINIC | Facility: CLINIC | Age: 50
End: 2020-07-17
Payer: COMMERCIAL

## 2020-07-17 DIAGNOSIS — Z00.6 RESEARCH STUDY PATIENT: ICD-10-CM

## 2020-07-17 DIAGNOSIS — Z03.818 ENCOUNTER FOR OBSERVATION FOR SUSPECTED EXPOSURE TO OTHER BIOLOGICAL AGENTS RULED OUT: ICD-10-CM

## 2020-07-17 PROCEDURE — 86769 SARS-COV-2 COVID-19 ANTIBODY: CPT

## 2020-07-17 PROCEDURE — U0003 INFECTIOUS AGENT DETECTION BY NUCLEIC ACID (DNA OR RNA); SEVERE ACUTE RESPIRATORY SYNDROME CORONAVIRUS 2 (SARS-COV-2) (CORONAVIRUS DISEASE [COVID-19]), AMPLIFIED PROBE TECHNIQUE, MAKING USE OF HIGH THROUGHPUT TECHNOLOGIES AS DESCRIBED BY CMS-2020-01-R: HCPCS

## 2020-07-17 NOTE — RESEARCH
Date of Consent: 7/17/2020    Sponsor: [facility]    Study Title/IRB Number: Observational study of Sars-CoV2 Immunoglobulin G (IgG) seroprevalence among the [facility] population over time 2020.163  Principle Investigator: Sirena Rizo, PhD    Did the patient need translation services? No   name: N/A    Prior to the Informed Consent (IC) being signed, or any study protocol required data collection, testing, procedure, or intervention being performed, the following was done and/or discussed:   Patient was given a paper copy of the IC for review    Patient was given study FAQ   Purpose of the study and qualifications to participate    Study design and tests or procedures done at this visit   Confidentiality and HIPAA Authorization for Release of Medical Records for the research trial/ subject's rights/research related injury   Risk, Benefits, Alternative Treatments, Compensation and Costs   Participation in the research trial is voluntary and patient may withdraw at anytime   Contact information for study related questions    Patient verbalizes understanding of the above: Yes  Contact information for PI and IRB given to patient: Yes  Patient able to adequately summarize: the purpose of the study, the risks associated with the study, and all procedures, testing, and follow-ups associated with the study: Yes    The consent was discussed verbally with the patient and all questions were answered satisfactorily. Patient gave verbal consent for the Seroprevalence research study with an IRB approval date of 06/23/2020.      The Consent, Consent Witness and name of Clinical Research Coordinator consenting was captured and documented in REDCap.    All Inclusion and Exclusion Criteria reviewed, subject meets all Inclusion criteria and does not meet any Exclusion Criteria at this time.     Patient Eligibility was confirmed.    Patient responded to survey questions.    The following biospecimen  collection procedures were collected:    -Nasopharyngeal Swab Collection  -Blood collection

## 2020-07-18 LAB — SARS-COV-2 IGG SERPLBLD QL IA.RAPID: NEGATIVE

## 2020-07-20 LAB — SARS-COV-2 RNA RESP QL NAA+PROBE: NOT DETECTED

## 2020-08-19 ENCOUNTER — CLINICAL SUPPORT (OUTPATIENT)
Dept: RESEARCH | Facility: CLINIC | Age: 50
End: 2020-08-19
Payer: COMMERCIAL

## 2020-08-19 PROCEDURE — 99999 PR PBB SHADOW E&M-EST. PATIENT-LVL III: ICD-10-PCS | Mod: PBBFAC,,,

## 2020-08-19 PROCEDURE — 99999 PR PBB SHADOW E&M-EST. PATIENT-LVL III: CPT | Mod: PBBFAC,,,

## 2020-08-19 NOTE — PROGRESS NOTES
Study title: A Phase 1/2/3. Placebo Controlled, Randomized, Observer-Blind, Dose-Finding Study to Describe the Safety, Tolerability, Immunogenicity and Potential Efficacy of SARS-COV-2 RNA Vaccine Candidates Against COVID-19 in Health Adults   IRB #: 2020.198  Sponsor: [employer]   Sponsor's Protocol: [id number]  Site Number: [id number]  Subject ID: [id number]    Visit Assessments; 8/19/2020    Screening of inclusion/exclusion criteria evaluation for [id number] has been reviewed by Genny Mosley. At this time, Luana Lou meets all inclusion and does not meet any one of the exclusion criteria.   Screening procedures completed during this visit include the following:   Demographic data (including gender, age, ethnicity, date of birth);    Height and weight obtained;   Vital Signs;  o B/P: 112/086  o Temperature: 037.1  o Pulse: 069   Reviewed and confirmed medical history in the past 6 months;   Review and confirmed of concomitant medications in the past 6 months;   Contraceptive discussion with Luana Lou. Patient expressed full understanding of adequate contraceptive measures and will contact site immediately if any changes are made in contraceptives;   Was UPT completed? Yes  o If yes, results are Negative    Physical exam deemed NOT necessary per P.I. discretion. I, Dr Jones reviewed eligibility and agree with assessments. Subject will be randomized.

## 2020-08-19 NOTE — PROGRESS NOTES
Date consent signed: 8/19/2020    Study title: A Phase 1/2/3. Placebo Controlled, Randomized, Observer-Blind, Dose-Finding Study to Describe the Safety, Tolerability, Immunogenicity and Potential Efficacy of SARS-COV-2 RNA Vaccine Candidates Against COVID-19 in Health Adults   IRB #: 2020.198  Sponsor: [employer]   Sponsor's Protocol: [id number]  Site Number: [id number]  Subject ID: [id number]    Informed Consent Process  Present for discussion: myself, patient  Was the consent done electronically: yes     Prior to the Informed Consent (IC) being signed, or any protocol required testing, procedure, or intervention being performed, the following was done or discussed:  · Purpose of the Study, Qualifications to Participate: yes  · Study Design, Schedule and Procedures: yes  · Risks, Benefits, Alternative Treatments, Compensation and Costs: yes  · Confidentiality and HIPAA Authorization for Release of Medical Records for the research trial/subject's right/study related injury: yes  · Study related contact information: yes  · Voluntary Participation and Withdrawal from the research trial at any time: yes  · Patient has been offered the opportunity to ask questions regarding the study and all questions were answered satisfactorily: yes  · CRC and PI contact information given to patient: yes  · Signed copy given to patient: yes  · Copy in patient's chart and original uploaded to Epic: yes    Patient able to adequately summarize: the purpose of the study, the risks associated with the study, and all procedures, testing, and follow-ups associated with the study: yes    Luana Shonarciso Lou electronically signed the informed consent form with an IRB approval date of 8/6/2020. Each slide of the eConsent form was reviewed with her and all questions answered satisfactorily. She then electronically signed the consent form, which was countersigned by the CRC on this day. A copy of the fully executed ICF was then provided to the subject via  e-mail. The original consent was electronically saved and uploaded to the [facility] EMR ([employer] and filed appropriately.     After signing consent, patient signed the Medical Billing Release form and Registration Authorization form.    Person Obtaining Consent: Lavon Arana

## 2020-08-28 ENCOUNTER — OFFICE VISIT (OUTPATIENT)
Dept: INTERNAL MEDICINE | Facility: CLINIC | Age: 50
End: 2020-08-28
Payer: COMMERCIAL

## 2020-08-28 ENCOUNTER — LAB VISIT (OUTPATIENT)
Dept: LAB | Facility: HOSPITAL | Age: 50
End: 2020-08-28
Attending: FAMILY MEDICINE
Payer: COMMERCIAL

## 2020-08-28 VITALS
RESPIRATION RATE: 18 BRPM | SYSTOLIC BLOOD PRESSURE: 132 MMHG | TEMPERATURE: 98 F | WEIGHT: 222.88 LBS | BODY MASS INDEX: 40.77 KG/M2 | HEART RATE: 53 BPM | DIASTOLIC BLOOD PRESSURE: 84 MMHG | OXYGEN SATURATION: 99 %

## 2020-08-28 DIAGNOSIS — R13.10 DYSPHAGIA, UNSPECIFIED TYPE: ICD-10-CM

## 2020-08-28 DIAGNOSIS — R79.89 ELEVATED LFTS: ICD-10-CM

## 2020-08-28 DIAGNOSIS — E66.9 OBESITY, UNSPECIFIED CLASSIFICATION, UNSPECIFIED OBESITY TYPE, UNSPECIFIED WHETHER SERIOUS COMORBIDITY PRESENT: ICD-10-CM

## 2020-08-28 DIAGNOSIS — G43.909 MIGRAINE WITHOUT STATUS MIGRAINOSUS, NOT INTRACTABLE, UNSPECIFIED MIGRAINE TYPE: ICD-10-CM

## 2020-08-28 DIAGNOSIS — E03.9 HYPOTHYROIDISM, UNSPECIFIED TYPE: ICD-10-CM

## 2020-08-28 DIAGNOSIS — E03.9 HYPOTHYROIDISM, UNSPECIFIED TYPE: Primary | ICD-10-CM

## 2020-08-28 DIAGNOSIS — N39.0 RECURRENT UTI: ICD-10-CM

## 2020-08-28 LAB
ALBUMIN SERPL BCP-MCNC: 3.8 G/DL (ref 3.5–5.2)
ALP SERPL-CCNC: 72 U/L (ref 55–135)
ALT SERPL W/O P-5'-P-CCNC: 30 U/L (ref 10–44)
ANION GAP SERPL CALC-SCNC: 7 MMOL/L (ref 8–16)
AST SERPL-CCNC: 26 U/L (ref 10–40)
BILIRUB SERPL-MCNC: 0.3 MG/DL (ref 0.1–1)
BUN SERPL-MCNC: 13 MG/DL (ref 6–20)
CALCIUM SERPL-MCNC: 9.2 MG/DL (ref 8.7–10.5)
CHLORIDE SERPL-SCNC: 104 MMOL/L (ref 95–110)
CO2 SERPL-SCNC: 29 MMOL/L (ref 23–29)
CREAT SERPL-MCNC: 1 MG/DL (ref 0.5–1.4)
EST. GFR  (AFRICAN AMERICAN): >60 ML/MIN/1.73 M^2
EST. GFR  (NON AFRICAN AMERICAN): >60 ML/MIN/1.73 M^2
GLUCOSE SERPL-MCNC: 80 MG/DL (ref 70–110)
POTASSIUM SERPL-SCNC: 3.7 MMOL/L (ref 3.5–5.1)
PROT SERPL-MCNC: 6.7 G/DL (ref 6–8.4)
SODIUM SERPL-SCNC: 140 MMOL/L (ref 136–145)
TSH SERPL DL<=0.005 MIU/L-ACNC: 1.12 UIU/ML (ref 0.4–4)

## 2020-08-28 PROCEDURE — 99214 PR OFFICE/OUTPT VISIT, EST, LEVL IV, 30-39 MIN: ICD-10-PCS | Mod: S$GLB,,, | Performed by: FAMILY MEDICINE

## 2020-08-28 PROCEDURE — 36415 COLL VENOUS BLD VENIPUNCTURE: CPT

## 2020-08-28 PROCEDURE — 3008F BODY MASS INDEX DOCD: CPT | Mod: CPTII,S$GLB,, | Performed by: FAMILY MEDICINE

## 2020-08-28 PROCEDURE — 84443 ASSAY THYROID STIM HORMONE: CPT

## 2020-08-28 PROCEDURE — 3008F PR BODY MASS INDEX (BMI) DOCUMENTED: ICD-10-PCS | Mod: CPTII,S$GLB,, | Performed by: FAMILY MEDICINE

## 2020-08-28 PROCEDURE — 99999 PR PBB SHADOW E&M-EST. PATIENT-LVL III: ICD-10-PCS | Mod: PBBFAC,,, | Performed by: FAMILY MEDICINE

## 2020-08-28 PROCEDURE — 99999 PR PBB SHADOW E&M-EST. PATIENT-LVL III: CPT | Mod: PBBFAC,,, | Performed by: FAMILY MEDICINE

## 2020-08-28 PROCEDURE — 99214 OFFICE O/P EST MOD 30 MIN: CPT | Mod: S$GLB,,, | Performed by: FAMILY MEDICINE

## 2020-08-28 PROCEDURE — 3079F DIAST BP 80-89 MM HG: CPT | Mod: CPTII,S$GLB,, | Performed by: FAMILY MEDICINE

## 2020-08-28 PROCEDURE — 3075F PR MOST RECENT SYSTOLIC BLOOD PRESS GE 130-139MM HG: ICD-10-PCS | Mod: CPTII,S$GLB,, | Performed by: FAMILY MEDICINE

## 2020-08-28 PROCEDURE — 3079F PR MOST RECENT DIASTOLIC BLOOD PRESSURE 80-89 MM HG: ICD-10-PCS | Mod: CPTII,S$GLB,, | Performed by: FAMILY MEDICINE

## 2020-08-28 PROCEDURE — 3075F SYST BP GE 130 - 139MM HG: CPT | Mod: CPTII,S$GLB,, | Performed by: FAMILY MEDICINE

## 2020-08-28 PROCEDURE — 80053 COMPREHEN METABOLIC PANEL: CPT

## 2020-08-28 RX ORDER — NITROFURANTOIN MACROCRYSTALS 50 MG/1
50 CAPSULE ORAL 2 TIMES DAILY
Qty: 90 CAPSULE | Refills: 0 | Status: SHIPPED | OUTPATIENT
Start: 2020-08-28

## 2020-08-28 RX ORDER — PROPRANOLOL HYDROCHLORIDE 20 MG/1
20 TABLET ORAL DAILY
Qty: 90 TABLET | Refills: 2 | Status: SHIPPED | OUTPATIENT
Start: 2020-08-28
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
## 2020-08-31 RX ORDER — LEVOTHYROXINE SODIUM 150 UG/1
150 TABLET ORAL DAILY
Qty: 90 TABLET | Refills: 1 | Status: SHIPPED | OUTPATIENT
Start: 2020-08-31 | End: 2021-01-28

## 2020-09-10 ENCOUNTER — OFFICE VISIT (OUTPATIENT)
Dept: RESEARCH | Facility: CLINIC | Age: 50
End: 2020-09-10
Payer: COMMERCIAL

## 2020-09-10 DIAGNOSIS — Z00.6 RESEARCH SUBJECT: Primary | ICD-10-CM

## 2020-09-10 PROCEDURE — 99999 PR PBB SHADOW E&M-EST. PATIENT-LVL II: ICD-10-PCS | Mod: PBBFAC,,,

## 2020-09-10 PROCEDURE — 99499 UNLISTED E&M SERVICE: CPT | Mod: S$GLB,,, | Performed by: INTERNAL MEDICINE

## 2020-09-10 PROCEDURE — 99999 PR PBB SHADOW E&M-EST. PATIENT-LVL II: CPT | Mod: PBBFAC,,,

## 2020-09-10 PROCEDURE — 99499 NO LOS: ICD-10-PCS | Mod: S$GLB,,, | Performed by: INTERNAL MEDICINE

## 2020-09-10 NOTE — RESEARCH
Study title: A Phase 1/2/3. Placebo Controlled, Randomized, Observer-Blind, Dose-Finding Study to Describe the Safety, Tolerability, Immunogenicity and Potential Efficacy of SARS-COV-2 RNA Vaccine Candidates Against COVID-19 in Health Adults   IRB #: 2020.198  Sponsor: [employer]   Sponsor's Protocol: [id number]  Site Number: [id number]  Subject ID: [id number]    Visit Assessments; 9/10/2020    The Subject presented for Visit 2 appointment as previously scheduled. Present during the visit are , Tara Walton and participant, Luana Lou. Participant wishes to continue participation in the trial, understands participation is voluntary and can withdraw at any point during the trial.     Visit 2 procedures completed during this visit include the following:      Review of Inclusion and Exclusion to determine the Subject's continued eligibility for study participation. Patient continues to meet eligibility. PI/Sub-I confirms and agrees to continued eligibility.    Review of Delay Criteria to confirm that the Subject is eligible for their second vaccination during today's visit.    Reviewed of adverse events since the Subject's Screening Visit.    Review for changes in concomitant medications since Subject's Screening Visit.    Review for changes in Contraceptive with Luana Lou. If patient of childbearing potential or male capable of producing, adequate contraceptive refresher discussion occurred. Patient expressed full understanding of adequate contraceptive measures and will contact site immediately if any changes are made in contraceptives.    Was UPT completed? Yes  o Results are negative    Per protocol, no physical required at V2 unless new or major health changes since last visit  or at PI discretion.     All visit assessment procedures have been completed according to protocol, IP order will be placed, and second vaccine will be prepared.

## 2020-09-24 ENCOUNTER — TELEPHONE (OUTPATIENT)
Dept: RESEARCH | Facility: CLINIC | Age: 50
End: 2020-09-24

## 2020-09-24 NOTE — TELEPHONE ENCOUNTER
Study title: A Phase 1/2/3. Placebo Controlled, Randomized, Observer-Blind, Dose-Finding Study to Describe the Safety, Tolerability, Immunogenicity and Potential Efficacy of SARS-COV-2 RNA Vaccine Candidates Against COVID-19 in Health Adults   IRB #: 2020.198  Sponsor: [employer]   Sponsor's Protocol: [id number]  Site Number: [id number]  Subject ID: [id number]    Pt called requesting a call back in regards to her being prescribed medication outside of the study for ID.    9/24/2020 2:56pm: Patient went to [facility] in [facility]. She was diagnosed with both flu A and B. She did not receive a COVID test. Strep test negative.   She was prescribed Xofluza, fluticasone propionate nasal spray, Singulair, Prednisone 10 mg BID for 5 days, and Ondansetron.   Patient states that she has not taken any medication and wanted to check that it was okay to start medication.  Patient was informed that she should take all medications as prescribed and it would not interfere with study.    Symptoms include: swollen lymph nodes, chills, fatigue, muscle pain, and nausea. Symptom start date 9/21/2020.    Potential COVID-19 visit deemed not necessary per PI due to flu diagnosis.

## 2020-10-08 ENCOUNTER — RESEARCH ENCOUNTER (OUTPATIENT)
Dept: RESEARCH | Facility: CLINIC | Age: 50
End: 2020-10-08
Payer: COMMERCIAL

## 2020-10-08 NOTE — PROGRESS NOTES
Study title: A Phase 1/2/3. Placebo Controlled, Randomized, Observer-Blind, Dose-Finding Study to Describe the Safety, Tolerability, Immunogenicity and Potential Efficacy of SARS-COV-2 RNA Vaccine Candidates Against COVID-19 in Health Adults   IRB #: 2020.198  Sponsor: [employer]   Sponsor's Protocol: [id number]  Site Number: [id number]  Subject ID: [id number]    Visit Assessments; 10/8/2020    The Subject presented for Visit 3 appointment as previously scheduled. Present during the visit are , Antoinette Ngo and participant, Luana Lou. Participant wishes to continue participation in the trial, understands participation is voluntary and can withdraw at any point during the trial.     Visit 3 procedures completed during this visit include the following:      MARLO Quispe -Reviewed of adverse events since the Subject's Screening Visit.    MARLO Quispe -Review for changes in concomitant medications since Subject's Screening Visit.    MARLO Quispe -Review for changes in Contraceptive with Luana Lou. If patient of childbearing potential or male capable of producing, adequate contraceptive refresher discussion occurred. Patient expressed full understanding of adequate contraceptive measures and will contact site immediately if any changes are made in contraceptives.        All visit assessment procedures have been completed according to protocol.

## 2020-12-22 ENCOUNTER — RESEARCH ENCOUNTER (OUTPATIENT)
Dept: RESEARCH | Facility: HOSPITAL | Age: 50
End: 2020-12-22

## 2020-12-22 NOTE — PROGRESS NOTES
Study title: A Phase 1/2/3. Placebo Controlled, Randomized, Observer-Blind, Dose-Finding Study to Describe the Safety, Tolerability, Immunogenicity and Potential Efficacy of SARS-COV-2 RNA Vaccine Candidates Against COVID-19 in Healthy Adults   IRB #: 2020.198  Sponsor: [employer]   Sponsor's Protocol: [id number]  Site Number: [id number]  Subject ID: [id number]     Subject contacted site regarding Pfizer's COVID-19 Vaccine Emergency Use Authorization (EUA). Site explained the process of unbinding at two potential time points. Luana Lou stated she understands.     Does subject wish to be unblinded? yes    Luana Lou was informed that unblinded site staff will perform unblinding procedures and site will follow up.

## 2021-01-06 DIAGNOSIS — Z12.31 OTHER SCREENING MAMMOGRAM: ICD-10-CM

## 2021-01-26 ENCOUNTER — RESEARCH ENCOUNTER (OUTPATIENT)
Dept: RESEARCH | Facility: HOSPITAL | Age: 51
End: 2021-01-26

## 2021-01-28 RX ORDER — LEVOTHYROXINE SODIUM 150 UG/1
TABLET ORAL
Qty: 90 TABLET | Refills: 2 | Status: SHIPPED | OUTPATIENT
Start: 2021-01-28 | End: 2021-09-13

## 2021-02-01 ENCOUNTER — RESEARCH ENCOUNTER (OUTPATIENT)
Dept: RESEARCH | Facility: HOSPITAL | Age: 51
End: 2021-02-01

## 2021-02-18 ENCOUNTER — RESEARCH ENCOUNTER (OUTPATIENT)
Dept: RESEARCH | Facility: HOSPITAL | Age: 51
End: 2021-02-18

## 2021-02-24 ENCOUNTER — RESEARCH ENCOUNTER (OUTPATIENT)
Dept: RESEARCH | Facility: CLINIC | Age: 51
End: 2021-02-24
Payer: COMMERCIAL

## 2021-02-24 DIAGNOSIS — Z23 NEED FOR VACCINATION: Primary | ICD-10-CM

## 2021-02-24 PROCEDURE — 91300 COVID-19, MRNA, LNP-S, PF, 30 MCG/0.3 ML DOSE VACCINE: ICD-10-PCS | Mod: ,,, | Performed by: INTERNAL MEDICINE

## 2021-02-24 PROCEDURE — 91300 COVID-19, MRNA, LNP-S, PF, 30 MCG/0.3 ML DOSE VACCINE: CPT | Mod: ,,, | Performed by: INTERNAL MEDICINE

## 2021-03-17 ENCOUNTER — RESEARCH ENCOUNTER (OUTPATIENT)
Dept: RESEARCH | Facility: CLINIC | Age: 51
End: 2021-03-17
Payer: COMMERCIAL

## 2021-03-17 DIAGNOSIS — Z23 NEED FOR VACCINATION: Primary | ICD-10-CM

## 2021-03-17 PROCEDURE — 91300 COVID-19, MRNA, LNP-S, PF, 30 MCG/0.3 ML DOSE VACCINE: CPT | Mod: ,,, | Performed by: INTERNAL MEDICINE

## 2021-03-17 PROCEDURE — 91300 COVID-19, MRNA, LNP-S, PF, 30 MCG/0.3 ML DOSE VACCINE: ICD-10-PCS | Mod: ,,, | Performed by: INTERNAL MEDICINE

## 2021-04-14 ENCOUNTER — RESEARCH ENCOUNTER (OUTPATIENT)
Dept: RESEARCH | Facility: HOSPITAL | Age: 51
End: 2021-04-14

## 2021-08-11 ENCOUNTER — TELEPHONE (OUTPATIENT)
Dept: RESEARCH | Facility: CLINIC | Age: 51
End: 2021-08-11

## 2021-09-10 ENCOUNTER — RESEARCH ENCOUNTER (OUTPATIENT)
Dept: RESEARCH | Facility: HOSPITAL | Age: 51
End: 2021-09-10

## 2021-09-10 ENCOUNTER — TELEPHONE (OUTPATIENT)
Dept: RESEARCH | Facility: CLINIC | Age: 51
End: 2021-09-10

## 2021-09-11 ENCOUNTER — PATIENT MESSAGE (OUTPATIENT)
Dept: INTERNAL MEDICINE | Facility: CLINIC | Age: 51
End: 2021-09-11

## 2021-09-13 RX ORDER — LEVOTHYROXINE SODIUM 137 UG/1
TABLET ORAL
COMMUNITY

## 2021-10-04 ENCOUNTER — TELEPHONE (OUTPATIENT)
Dept: RESEARCH | Facility: CLINIC | Age: 51
End: 2021-10-04

## 2021-10-07 ENCOUNTER — TELEPHONE (OUTPATIENT)
Dept: RESEARCH | Facility: HOSPITAL | Age: 51
End: 2021-10-07

## 2021-10-08 ENCOUNTER — RESEARCH ENCOUNTER (OUTPATIENT)
Dept: RESEARCH | Facility: CLINIC | Age: 51
End: 2021-10-08
Payer: COMMERCIAL

## 2021-10-08 DIAGNOSIS — Z23 NEED FOR VACCINATION: Primary | ICD-10-CM

## 2021-10-08 PROCEDURE — 91300 COVID-19, MRNA, LNP-S, PF, 30 MCG/0.3 ML DOSE VACCINE: CPT | Mod: PBBFAC | Performed by: INTERNAL MEDICINE

## 2021-11-11 ENCOUNTER — RESEARCH ENCOUNTER (OUTPATIENT)
Dept: RESEARCH | Facility: HOSPITAL | Age: 51
End: 2021-11-11
Payer: COMMERCIAL

## 2021-11-26 ENCOUNTER — RESEARCH ENCOUNTER (OUTPATIENT)
Dept: RESEARCH | Facility: HOSPITAL | Age: 51
End: 2021-11-26
Payer: COMMERCIAL

## 2021-11-26 ENCOUNTER — TELEPHONE (OUTPATIENT)
Dept: RESEARCH | Facility: HOSPITAL | Age: 51
End: 2021-11-26
Payer: COMMERCIAL

## 2022-03-09 DIAGNOSIS — Z12.31 OTHER SCREENING MAMMOGRAM: ICD-10-CM

## 2022-04-01 ENCOUNTER — TELEPHONE (OUTPATIENT)
Dept: RESEARCH | Facility: HOSPITAL | Age: 52
End: 2022-04-01
Payer: COMMERCIAL

## 2022-04-01 ENCOUNTER — RESEARCH ENCOUNTER (OUTPATIENT)
Dept: RESEARCH | Facility: HOSPITAL | Age: 52
End: 2022-04-01
Payer: COMMERCIAL

## 2022-04-01 NOTE — PROGRESS NOTES
Study title: A Phase 1/2/3. Placebo Controlled, Randomized, Observer-Blind, Dose-Finding Study to Describe the Safety, Tolerability, Immunogenicity and Potential Efficacy of SARS-COV-2 RNA Vaccine Candidates Against COVID-19 in Healthy Adults   IRB #: 2020.198  Sponsor: [employer]   Sponsor's Protocol: [id number]  Site Number: [id number]  Subject ID: [id number]      Luana Lou was contacted via telephone call on 4/1/2022 for their Visit 503, 6 month follow up. The following information was collected from her         Has the patient had any prohibited medications since their last visit? no   Has the patient had any major changes in health since their last visit (CHASTITY)? no   Has the patient had any non-study vaccinations since their last visit? yes, Tegan JAN 2022        Luana Lou was informed that their next visit will be an in person visit. Subject instructed to continue completing their weekly Illness Diary and contact the site staff or investigator if a medically attend event or hospitalization occurs.

## 2022-06-09 ENCOUNTER — TELEPHONE (OUTPATIENT)
Dept: RESEARCH | Facility: HOSPITAL | Age: 52
End: 2022-06-09
Payer: COMMERCIAL

## 2022-06-09 ENCOUNTER — RESEARCH ENCOUNTER (OUTPATIENT)
Dept: RESEARCH | Facility: HOSPITAL | Age: 52
End: 2022-06-09
Payer: COMMERCIAL

## 2022-06-09 NOTE — TELEPHONE ENCOUNTER
Study title: A Phase 1/2/3. Placebo Controlled, Randomized, Observer-Blind, Dose-Finding Study to Describe the Safety, Tolerability, Immunogenicity and Potential Efficacy of SARS-COV-2 RNA Vaccine Candidates Against COVID-19 in Health Adults   IRB #: 2020.198  Sponsor: [employer]   Sponsor's Protocol: [id number]  Site Number: [id number]  Subject ID: [id number]     Patient has moved to [place], [place] and has requested a transfer.

## 2022-06-09 NOTE — PROGRESS NOTES
"Study title: A Phase 1/2/3. Placebo Controlled, Randomized, Observer-Blind, Dose-Finding Study to Describe the Safety, Tolerability, Immunogenicity and Potential Efficacy of SARS-COV-2 RNA Vaccine Candidates Against COVID-19 in Healthy Adults   IRB #: 2020.198  Sponsor: [employer]   Sponsor's Protocol: [id number]  Site Number: [id number]  Subject ID: [id number]    Josué Lou  reported "YES" in the COVID Illness Diary and based on PI decision a POTENTIAL COVID-19 VISIT is deemed necessary. The subject has the following symptoms:    Fever: yes  New or increased cough:  no  New or increased shortness of breath: no  Chills: yes  New or increased muscle pain: yes  New loss of taste/smell: no  Sore throat: yes  diarrhea: no  vomiting: no    Other: Sinus congestion, post nasal drip    Symptom start date: 29 MAY 2022    Symptoms resolved? yes   IF YES, date resolved: 3 JEANINE 2022    Was subject instructed to collect self swab? no   Anticipated date of collection: oow   Remind subject to write subject ID on the self-swab card contained in kit.     Did subject receive an outside COVID-19 test?  yes   If yes, Positive, 29 MAY 2022    Did the subject seek outside medical care?  no    Toxicity Grade:  1 - MILD: Does not interfere with participant's usual function.     Subject developed a fever, chills, joint pain, sore throat, sinus congestion, and post nasal drip 29 MAY 2022. Tested POSITIVE for Covid-19. Symptoms resolved 3 JEANINE 2022. Report captured oow for self-swab              "

## 2022-08-25 ENCOUNTER — TELEPHONE (OUTPATIENT)
Dept: RESEARCH | Facility: CLINIC | Age: 52
End: 2022-08-25
Payer: COMMERCIAL

## 2022-08-25 ENCOUNTER — PATIENT OUTREACH (OUTPATIENT)
Dept: ADMINISTRATIVE | Facility: HOSPITAL | Age: 52
End: 2022-08-25
Payer: COMMERCIAL

## 2022-08-25 NOTE — PROGRESS NOTES
Called patient to schedule overdue PCP appt, Patient answered and stated that she lives in [place].

## 2022-08-25 NOTE — TELEPHONE ENCOUNTER
Study title: A Phase 1/2/3. Placebo Controlled, Randomized, Observer-Blind, Dose-Finding Study to Describe the Safety, Tolerability, Immunogenicity and Potential Efficacy of SARS-COV-2 RNA Vaccine Candidates Against COVID-19 in Healthy Adults   IRB #: 2020.198  Sponsor: [employer]   Sponsor's Protocol: [id number]  Site Number: [id number]  Subject ID: [id number]    Subject called site yesterday requesting update on potential transfer to the [facility]. Site followed up with monitor. There are 3 potential transfer sites in that area.     Attempted to contact subject regarding the transfer. Left VM requesting call back and informing subject next visit on study is currently scheduled for OCT22.

## 2022-09-22 ENCOUNTER — TELEPHONE (OUTPATIENT)
Dept: RESEARCH | Facility: HOSPITAL | Age: 52
End: 2022-09-22
Payer: COMMERCIAL

## 2022-09-22 NOTE — TELEPHONE ENCOUNTER
Study title: A Phase 1/2/3. Placebo Controlled, Randomized, Observer-Blind, Dose-Finding Study to Describe the Safety, Tolerability, Immunogenicity and Potential Efficacy of SARS-COV-2 RNA Vaccine Candidates Against COVID-19 in Health Adults   IRB #: 2020.198  Sponsor: [employer]   Sponsor's Protocol: [id number]  Site Number: [id number]  Subject ID: [id number]    The participant has not completed a weekly illness diary entry since 7 SEP 2022.     CRC contacted the participant via telephone and reviewed the information below:    Has the participant experienced any potential COVID-19 symptoms since the illness diary was last completed? no    The participant was re-educated on completing the eDiary as required per protocol and expressed understanding.    Participant requested further information regarding their transfer to [place]. Patient will be contacted once more guidance is received.

## 2022-09-30 ENCOUNTER — TELEPHONE (OUTPATIENT)
Dept: RESEARCH | Facility: HOSPITAL | Age: 52
End: 2022-09-30
Payer: COMMERCIAL

## 2022-09-30 NOTE — TELEPHONE ENCOUNTER
Study title: A Phase 1/2/3. Placebo Controlled, Randomized, Observer-Blind, Dose-Finding Study to Describe the Safety, Tolerability, Immunogenicity and Potential Efficacy of SARS-COV-2 RNA Vaccine Candidates Against COVID-19 in Health Adults   IRB #: 2020.198  Sponsor: [employer]   Sponsor's Protocol: [id number]  Site Number: [id number]  Subject ID: [id number]    Site contacted patient to inform them that their Visit 504 on Friday, 7 Oct 2022 will be cancelled until further notice.    Patient will be contacted once site receieves further guidance to either reschedule or conduct the close-out visit via telephone.    Patient expresses understanding and agrees to stand by until the site contacts them.

## 2022-10-06 ENCOUNTER — RESEARCH ENCOUNTER (OUTPATIENT)
Dept: RESEARCH | Facility: CLINIC | Age: 52
End: 2022-10-06
Payer: COMMERCIAL

## 2022-10-06 NOTE — PROGRESS NOTES
Study title: A Phase 1/2/3. Placebo Controlled, Randomized, Observer-Blind, Dose-Finding Study to Describe the Safety, Tolerability, Immunogenicity and Potential Efficacy of SARS-COV-2 RNA Vaccine Candidates Against COVID-19 in Healthy Adults   IRB #: 2020.198  Sponsor: [employer]   Sponsor's Protocol: [id number]  Site Number: [id number]  Subject ID: [id number]    Site contacted subject to inform subject of study ending per PA20. Subject was informed that the study was ending earlier than planned as the study is no longer testing additional vaccine doses or new variant vaccines on study, and the required safety follow up period has been completed. Subject was informed that from today, 06OCT2022, forward, study participation has concluded. Subject expressed understanding and was thanked for their participation in this study.    Subject was informed that Dr. Neno Huerta replaced Dr. Marie Jones as PI of the study. yes      Did the subject have any ongoing AE's yes  If yes, AE end date? Flu A and Flu B, Start Date:21SEP2020, End Date:30SEP2020    Did the subject have any ongoing illness symptoms? no  If yes, symptom resolution date? N/A     Did the subject have a provisional device? no  Subject was instructed to return provisional device? N/A    Did the subject use a personal device? yes  Subject was instructed to delete Trial Max arabella from device? yes      Off Study Date: 06OCT2022  Off Study Reason: Follow up Complete per PA20.

## (undated) DEVICE — COVER OVERHEAD SURG LT BLUE

## (undated) DEVICE — CANNULA ENDOPATH XCEL 5X100MM

## (undated) DEVICE — CATH IV CATHLON W/HUB14GAX

## (undated) DEVICE — GLOVE SURG BIOGEL LATEX SZ 7.5

## (undated) DEVICE — SUPPORT ULNA NERVE PROTECTOR

## (undated) DEVICE — NDL SAFETY 25G X 1.5 ECLIPSE

## (undated) DEVICE — SEE MEDLINE ITEM 152739

## (undated) DEVICE — SEE MEDLINE ITEM 157117

## (undated) DEVICE — CLIPPER BLADE MOD 4406 (CAREF)

## (undated) DEVICE — SUT VICRYL 4-0 ANTIBACT

## (undated) DEVICE — SEAL SCOPE WARMER 20/BX

## (undated) DEVICE — SYR 10CC LUER LOCK

## (undated) DEVICE — SUT VICRYL 0 27 CT-2

## (undated) DEVICE — CONTAINER SPECIMEN STRL 4OZ

## (undated) DEVICE — DEVICE CLOSURE DISP 14G

## (undated) DEVICE — ELECTRODE ENDOPATH + II 5X34

## (undated) DEVICE — TROCAR ENDOPATH XCEL 5X100MM

## (undated) DEVICE — SEE MEDLINE ITEM 146372

## (undated) DEVICE — TUBING HEATED INSUFFLATOR

## (undated) DEVICE — SEE MEDLINE ITEM 157027

## (undated) DEVICE — SEE MEDLINE ITEM 146420

## (undated) DEVICE — HEMOSTAT SURGICEL 2X3IN

## (undated) DEVICE — HANDLE PISTOL GRIP HAND CNTRL

## (undated) DEVICE — TAPE CLOTH SOFT MEDIPORE 4IN

## (undated) DEVICE — SPONGE GAUZE 16PLY 4X4

## (undated) DEVICE — BAG TISS RETRV MONARCH 10MM

## (undated) DEVICE — CATH CHOLANGIO L-L 4FR 40CM

## (undated) DEVICE — SUT CTD VICRYL 0 UND BR SUT

## (undated) DEVICE — SOL 9P NACL IRR PIC IL

## (undated) DEVICE — DRAPE ABDOMINAL TIBURON 14X11

## (undated) DEVICE — NDL PNEUMO INSUFFLATI 120MM

## (undated) DEVICE — APPLIER CLIP ENDO LIGAMAX 5MM

## (undated) DEVICE — CLOSURE SKIN STERI STRIP 1/2X4

## (undated) DEVICE — SOL NS 1000CC

## (undated) DEVICE — SYS IRRIG PRESSURIZED SPIKE

## (undated) DEVICE — DECANTER VIAL ASEPTIC TRANSFER

## (undated) DEVICE — ADHESIVE MASTISOL VIAL 48/BX

## (undated) DEVICE — SCISSOR TIPS METAENBAUM LAP

## (undated) DEVICE — DRAPE MOBILE C-ARM

## (undated) DEVICE — SPONGE LAP 18X18 PREWASHED

## (undated) DEVICE — MANIFOLD 4 PORT

## (undated) DEVICE — SYR 3CC LUER LOC

## (undated) DEVICE — GAUZE SPONGE 4X4 12PLY

## (undated) DEVICE — ELECTRODE REM PLYHSV RETURN 9

## (undated) DEVICE — SUT VICRYL 3-0 27 SH

## (undated) DEVICE — TROCAR ENDOPATH XCEL 11MM 10CM

## (undated) DEVICE — SYR ONLY LUER LOCK 20CC

## (undated) DEVICE — POSITIONER HEAD DONUT 9IN FOAM

## (undated) DEVICE — KIT ANTIFOG

## (undated) DEVICE — SYR 30CC LUER LOCK

## (undated) DEVICE — SEE MEDLINE ITEM 154981

## (undated) DEVICE — CORD LAP 10 DISP

## (undated) DEVICE — SEE MEDLINE ITEM 152622